# Patient Record
Sex: MALE | Race: WHITE | NOT HISPANIC OR LATINO | Employment: FULL TIME | URBAN - METROPOLITAN AREA
[De-identification: names, ages, dates, MRNs, and addresses within clinical notes are randomized per-mention and may not be internally consistent; named-entity substitution may affect disease eponyms.]

---

## 2017-01-11 ENCOUNTER — OFFICE VISIT (OUTPATIENT)
Dept: CARDIOLOGY | Facility: CLINIC | Age: 57
End: 2017-01-11

## 2017-01-11 ENCOUNTER — HOSPITAL ENCOUNTER (OUTPATIENT)
Dept: CARDIOLOGY | Facility: HOSPITAL | Age: 57
Discharge: HOME | End: 2017-01-11
Attending: INTERNAL MEDICINE | Admitting: INTERNAL MEDICINE

## 2017-01-11 VITALS
SYSTOLIC BLOOD PRESSURE: 110 MMHG | HEART RATE: 59 BPM | DIASTOLIC BLOOD PRESSURE: 72 MMHG | WEIGHT: 255 LBS | BODY MASS INDEX: 33.8 KG/M2 | HEIGHT: 73 IN

## 2017-01-11 VITALS
WEIGHT: 250 LBS | BODY MASS INDEX: 33.13 KG/M2 | DIASTOLIC BLOOD PRESSURE: 80 MMHG | SYSTOLIC BLOOD PRESSURE: 119 MMHG | HEIGHT: 73 IN

## 2017-01-11 DIAGNOSIS — I42.8 NICM (NONISCHEMIC CARDIOMYOPATHY) (HCC): ICD-10-CM

## 2017-01-11 DIAGNOSIS — I42.8 NONISCHEMIC CARDIOMYOPATHY (HCC): Primary | ICD-10-CM

## 2017-01-11 DIAGNOSIS — I50.1 LEFT HEART FAILURE (HCC): Primary | ICD-10-CM

## 2017-01-11 LAB
BH CV ECHO MEAS - AO ROOT AREA (BSA CORRECTED): 1.5
BH CV ECHO MEAS - AO ROOT AREA: 9.3 CM^2
BH CV ECHO MEAS - AO ROOT DIAM: 3.4 CM
BH CV ECHO MEAS - BSA(HAYCOCK): 2.4 M^2
BH CV ECHO MEAS - BSA: 2.4 M^2
BH CV ECHO MEAS - BZI_BMI: 33 KILOGRAMS/M^2
BH CV ECHO MEAS - BZI_METRIC_HEIGHT: 185.4 CM
BH CV ECHO MEAS - BZI_METRIC_WEIGHT: 113.4 KG
BH CV ECHO MEAS - CONTRAST EF (2CH): 56.3 ML/M^2
BH CV ECHO MEAS - CONTRAST EF 4CH: 49.5 ML/M^2
BH CV ECHO MEAS - EDV(CUBED): 127.7 ML
BH CV ECHO MEAS - EDV(MOD-SP2): 71 ML
BH CV ECHO MEAS - EDV(MOD-SP4): 103 ML
BH CV ECHO MEAS - EDV(TEICH): 120.2 ML
BH CV ECHO MEAS - EF(CUBED): 66.7 %
BH CV ECHO MEAS - EF(MOD-SP2): 56.3 %
BH CV ECHO MEAS - EF(TEICH): 57.9 %
BH CV ECHO MEAS - ESV(CUBED): 42.5 ML
BH CV ECHO MEAS - ESV(MOD-SP2): 31 ML
BH CV ECHO MEAS - ESV(MOD-SP4): 52 ML
BH CV ECHO MEAS - ESV(TEICH): 50.6 ML
BH CV ECHO MEAS - FS: 30.7 %
BH CV ECHO MEAS - IVS/LVPW: 1
BH CV ECHO MEAS - IVSD: 1.4 CM
BH CV ECHO MEAS - LA DIMENSION: 4.1 CM
BH CV ECHO MEAS - LA/AO: 1.2
BH CV ECHO MEAS - LV DIASTOLIC VOL/BSA (35-75): 43.5 ML/M^2
BH CV ECHO MEAS - LV MASS(C)D: 289.1 GRAMS
BH CV ECHO MEAS - LV MASS(C)DI: 122.2 GRAMS/M^2
BH CV ECHO MEAS - LV SYSTOLIC VOL/BSA (12-30): 22 ML/M^2
BH CV ECHO MEAS - LVIDD: 5 CM
BH CV ECHO MEAS - LVIDS: 3.5 CM
BH CV ECHO MEAS - LVLD AP2: 7 CM
BH CV ECHO MEAS - LVLD AP4: 7.1 CM
BH CV ECHO MEAS - LVLS AP2: 6 CM
BH CV ECHO MEAS - LVLS AP4: 6.7 CM
BH CV ECHO MEAS - LVPWD: 1.4 CM
BH CV ECHO MEAS - RVDD: 3.2 CM
BH CV ECHO MEAS - SI(CUBED): 36 ML/M^2
BH CV ECHO MEAS - SI(MOD-SP2): 16.9 ML/M^2
BH CV ECHO MEAS - SI(MOD-SP4): 21.6 ML/M^2
BH CV ECHO MEAS - SI(TEICH): 29.4 ML/M^2
BH CV ECHO MEAS - SV(CUBED): 85.1 ML
BH CV ECHO MEAS - SV(MOD-SP2): 40 ML
BH CV ECHO MEAS - SV(MOD-SP4): 51 ML
BH CV ECHO MEAS - SV(TEICH): 69.6 ML

## 2017-01-11 PROCEDURE — 93308 TTE F-UP OR LMTD: CPT

## 2017-01-11 PROCEDURE — 93308 TTE F-UP OR LMTD: CPT | Performed by: INTERNAL MEDICINE

## 2017-01-11 PROCEDURE — 99213 OFFICE O/P EST LOW 20 MIN: CPT | Performed by: INTERNAL MEDICINE

## 2017-01-11 RX ORDER — CARVEDILOL 6.25 MG/1
6.25 TABLET ORAL 2 TIMES DAILY
Qty: 180 TABLET | Refills: 3 | Status: SHIPPED | OUTPATIENT
Start: 2017-01-11 | End: 2018-01-18 | Stop reason: SDUPTHER

## 2017-01-11 RX ORDER — LISINOPRIL 10 MG/1
10 TABLET ORAL DAILY
Qty: 90 TABLET | Refills: 3 | Status: SHIPPED | OUTPATIENT
Start: 2017-01-11 | End: 2018-01-18 | Stop reason: SDUPTHER

## 2017-01-11 NOTE — LETTER
January 11, 2017     Nathaniel Jordan MD  [address] [place] [address] [address]  [place] [address]    Patient: Conner Dunn   YOB: 1960   Date of Visit: 1/11/2017       Dear Dr. Nikki MD:    Thank you for referring Conner Dunn to me for evaluation. Below are the relevant portions of my assessment and plan of care.    If you have questions, please do not hesitate to call me. I look forward to following Conner along with you.         Sincerely,        Kira Scanlon MD        CC: No Recipients  Kira Scanlon MD  1/11/2017  2:43 PM  Signed  Conner Dunn  1960  [phone]  [phone]    01/11/2017    Nathaniel Jordan MD    Chief Complaint   Patient presents with   • Cardiomyopathy     IDENTIFICATION:  A 56-year-old  white male  and resident of [place], [place].     PROBLEM LIST:  1. Nonischemic cardiomyopathy:  a. History of abnormal echocardiogram, 02/21/2011, revealing LVEF (26%) with moderate to severe global  hypokinesis, moderate LVH, left atrium of 4.8 cm.  b. Cardiolite GXT, 02/21/2011, Dr. Hernandez, revealing ejection fraction of 26%.  Patient walked for 8 minutes and 28 seconds, achieving 86% of target heart rate.  Negative for chest pain.  PVCs at rest,  resolving with exercise.  No evidence of reversible ischemia.  c. Cardiac catheterization, 02/28/2011, Kira Scanlon MD, revealing LVEF (20% to 25%) with mild mitral regurgitation.  Normal coronary arteries.  d. Evaluation at the [facility] in  April 2011 with an echocardiogram revealing an ejection fraction of 30%.   e. Holter monitor, April 2011, revealing 11,000 PVCs in a 24-hour period of time.  f. Initiation of amiodarone therapy.  g. Repeat echocardiogram, August 2011 at the [facility]  revealed an ejection fraction of 51%.  Holter revealed rare PVCs.  h. Discontinuation of amiodarone therapy.  i. Echocardiogram, December 2011, [facility], with an LVEF (61%), with a  Holter monitor revealing 3% PVCs to the total beats in a 24-hour period.  j.  Echocardiogram, 01/23/2014:  LVEF 55% to 60% with trace MR and trace TR.  k. Echocardiogram, 1/11/2017: EF 55-60%. Trace MR, trace TR.   2. Severe obstructive sleep apnea, diagnosed in April 2011, currently uses CPAP on a nightly basis.  3. Metabolic syndrome.  4. History of mild renal insufficiency.  5.  Mild-to-moderate ascending aortic dilatation, 42 mm ([facility]).  6. Cholecystectomy, 9/9/2016.     Allergies   Allergen Reactions   • Ibuprofen        Current Medications:      Current Outpatient Prescriptions:   •  aspirin 81 MG EC tablet, Take 81 mg by mouth daily., Disp: , Rfl:   •  carvedilol (COREG) 6.25 MG tablet, TAKE ONE TABLET BY MOUTH TWICE A DAY, Disp: 180 tablet, Rfl: 2  •  lisinopril (PRINIVIL,ZESTRIL) 10 MG tablet, TAKE ONE TABLET BY MOUTH DAILY, Disp: 90 tablet, Rfl: 2  •  Methylcellulose, Laxative, (CITRUCEL PO), Take  by mouth Daily., Disp: , Rfl:   •  Multiple Vitamins-Minerals (MULTIVITAMIN PO), Take 1 tablet by mouth daily., Disp: , Rfl:   •  naproxen sodium (ALEVE) 220 MG tablet, Take 440 mg by mouth daily as needed for mild pain (1-3)., Disp: , Rfl:     HPI    Mr. Dunn presents today for echocardiogram and 12 month follow up for cardiomyopathy. He had an emergency cholecystectomy in September of 2016, and has recovered well. He reports feeling well from a cardiac standpoint, denying chest pain, palpitations, shortness of breath, edema, PND, orthopnea, dizziness, and syncope. He typically keeps active walking outside, and has lost 4 lbs since this time last year.     The following portions of the patient's history were reviewed and updated as appropriate: allergies, current medications and problem list.    Pertinent positives as listed in the HPI.  All other systems reviewed are negative.    Vitals:    01/11/17 1144   BP: 110/72   BP Location: Right arm   Patient Position: Sitting   Pulse: 59   Weight: 255 lb  "(116 kg)   Height: 73\" (185.4 cm)       General: Alert and oriented  Neck: Jugular venous pressure is within normal limits. Carotids have normal upstrokes without bruits.   Cardiovascular: Heart has a nondisplaced focal PMI. Regular rate and rhythm without murmur, gallop or rub.  Lungs: Clear without rales or wheezes. Equal expansion is noted.   Extremities: Show no edema.  Skin: warm and dry.  Neurologic: nonfocal      Diagnostic Data:    Lab Results   Component Value Date    GLUCOSE 117 (H) 09/09/2016    BUN 12 09/09/2016    CREATININE 1.20 09/09/2016    EGFRIFNONA 63 09/09/2016    BCR 10.0 09/09/2016    CO2 27.0 09/09/2016    CALCIUM 9.9 09/09/2016    ALBUMIN 4.30 09/09/2016    LABIL2 1.5 09/09/2016    AST 33 09/09/2016    ALT 33 09/09/2016     Lab Results   Component Value Date    WBC 12.64 (H) 09/09/2016    HGB 14.8 09/09/2016    HCT 45.1 09/09/2016    MCV 92.8 09/09/2016     09/09/2016     Procedures    Assessment:      ICD-10-CM ICD-9-CM   1. Nonischemic cardiomyopathy I42.9 425.4       Plan:    1. Recommended increasing aerobic exercise. Suggested indoor activities (indoor track, stationary bike) during the colder months.   2. Continue current medications.  3. F/up in 12 months or sooner if needed.    Scribed for Kira Scanlon MD by Margareth Jones. 1/11/2017  12:02 PM    I Kira Scanlon MD personally performed the services described in this documentation as scribed by the above individual in my presence, and it is both accurate and complete.          "

## 2017-01-11 NOTE — PROGRESS NOTES
Conner Dunn  1960  [phone]  [phone]    01/11/2017    Nathaniel Jordan MD    Chief Complaint   Patient presents with   • Cardiomyopathy     IDENTIFICATION:  A 56-year-old  white male  and resident of [place], [place].     PROBLEM LIST:  1. Nonischemic cardiomyopathy:  a. History of abnormal echocardiogram, 02/21/2011, revealing LVEF (26%) with moderate to severe global  hypokinesis, moderate LVH, left atrium of 4.8 cm.  b. Cardiolite GXT, 02/21/2011, Dr. Hernandez, revealing ejection fraction of 26%.  Patient walked for 8 minutes and 28 seconds, achieving 86% of target heart rate.  Negative for chest pain.  PVCs at rest,  resolving with exercise.  No evidence of reversible ischemia.  c. Cardiac catheterization, 02/28/2011, Kira Scanlon MD, revealing LVEF (20% to 25%) with mild mitral regurgitation.  Normal coronary arteries.  d. Evaluation at the [facility] in  April 2011 with an echocardiogram revealing an ejection fraction of 30%.   e. Holter monitor, April 2011, revealing 11,000 PVCs in a 24-hour period of time.  f. Initiation of amiodarone therapy.  g. Repeat echocardiogram, August 2011 at the [facility]  revealed an ejection fraction of 51%.  Holter revealed rare PVCs.  h. Discontinuation of amiodarone therapy.  i. Echocardiogram, December 2011, [facility], with an LVEF (61%), with a Holter monitor revealing 3% PVCs to the total beats in a 24-hour period.  j.  Echocardiogram, 01/23/2014:  LVEF 55% to 60% with trace MR and trace TR.  k. Echocardiogram, 1/11/2017: EF 55-60%. Trace MR, trace TR.   2. Severe obstructive sleep apnea, diagnosed in April 2011, currently uses CPAP on a nightly basis.  3. Metabolic syndrome.  4. History of mild renal insufficiency.  5.  Mild-to-moderate ascending aortic dilatation, 42 mm ([facility]).  6. Cholecystectomy, 9/9/2016.     Allergies   Allergen Reactions   • Ibuprofen        Current Medications:      Current Outpatient  "Prescriptions:   •  aspirin 81 MG EC tablet, Take 81 mg by mouth daily., Disp: , Rfl:   •  carvedilol (COREG) 6.25 MG tablet, TAKE ONE TABLET BY MOUTH TWICE A DAY, Disp: 180 tablet, Rfl: 2  •  lisinopril (PRINIVIL,ZESTRIL) 10 MG tablet, TAKE ONE TABLET BY MOUTH DAILY, Disp: 90 tablet, Rfl: 2  •  Methylcellulose, Laxative, (CITRUCEL PO), Take  by mouth Daily., Disp: , Rfl:   •  Multiple Vitamins-Minerals (MULTIVITAMIN PO), Take 1 tablet by mouth daily., Disp: , Rfl:   •  naproxen sodium (ALEVE) 220 MG tablet, Take 440 mg by mouth daily as needed for mild pain (1-3)., Disp: , Rfl:     HPI    Mr. Dunn presents today for echocardiogram and 12 month follow up for cardiomyopathy. He had an emergency cholecystectomy in September of 2016, and has recovered well. He reports feeling well from a cardiac standpoint, denying chest pain, palpitations, shortness of breath, edema, PND, orthopnea, dizziness, and syncope. He typically keeps active walking outside, and has lost 4 lbs since this time last year.     The following portions of the patient's history were reviewed and updated as appropriate: allergies, current medications and problem list.    Pertinent positives as listed in the HPI.  All other systems reviewed are negative.    Vitals:    01/11/17 1144   BP: 110/72   BP Location: Right arm   Patient Position: Sitting   Pulse: 59   Weight: 255 lb (116 kg)   Height: 73\" (185.4 cm)       General: Alert and oriented  Neck: Jugular venous pressure is within normal limits. Carotids have normal upstrokes without bruits.   Cardiovascular: Heart has a nondisplaced focal PMI. Regular rate and rhythm without murmur, gallop or rub.  Lungs: Clear without rales or wheezes. Equal expansion is noted.   Extremities: Show no edema.  Skin: warm and dry.  Neurologic: nonfocal      Diagnostic Data:    Lab Results   Component Value Date    GLUCOSE 117 (H) 09/09/2016    BUN 12 09/09/2016    CREATININE 1.20 09/09/2016    EGFRIFNONA 63 " 09/09/2016    BCR 10.0 09/09/2016    CO2 27.0 09/09/2016    CALCIUM 9.9 09/09/2016    ALBUMIN 4.30 09/09/2016    LABIL2 1.5 09/09/2016    AST 33 09/09/2016    ALT 33 09/09/2016     Lab Results   Component Value Date    WBC 12.64 (H) 09/09/2016    HGB 14.8 09/09/2016    HCT 45.1 09/09/2016    MCV 92.8 09/09/2016     09/09/2016     Procedures    Assessment:      ICD-10-CM ICD-9-CM   1. Nonischemic cardiomyopathy I42.9 425.4       Plan:    1. Recommended increasing aerobic exercise. Suggested indoor activities (indoor track, stationary bike) during the colder months.   2. Continue current medications.  3. F/up in 12 months or sooner if needed.    Scribed for Kira Scanlon MD by Margareth Jones. 1/11/2017  12:02 PM    I Kira Scanlon MD personally performed the services described in this documentation as scribed by the above individual in my presence, and it is both accurate and complete.

## 2017-01-11 NOTE — MR AVS SNAPSHOT
Conner Dunn   1/11/2017 11:45 AM   Office Visit    Dept Phone:  [phone]   Encounter #:  [id number]    Provider:  Kira Scanlon MD   Department:  [facility] CARDIOLOGY                Your Full Care Plan              Today's Medication Changes          These changes are accurate as of: 1/11/17 12:09 PM.  If you have any questions, ask your nurse or doctor.               Medication(s)that have changed:     carvedilol 6.25 MG tablet   Commonly known as:  COREG   Take 1 tablet by mouth 2 (Two) Times a Day.   What changed:  See the new instructions.   Changed by:  Kira Scanlon MD       lisinopril 10 MG tablet   Commonly known as:  PRINIVIL,ZESTRIL   Take 1 tablet by mouth Daily.   What changed:  See the new instructions.   Changed by:  Kira Scanlon MD            Where to Get Your Medications      These medications were sent to [address] [place] [id number] [place] [id number] FX  [address] [facility] [address]     Phone:  [phone]     carvedilol 6.25 MG tablet    lisinopril 10 MG tablet                  Your Updated Medication List          This list is accurate as of: 1/11/17 12:09 PM.  Always use your most recent med list.                aspirin 81 MG EC tablet       carvedilol 6.25 MG tablet   Commonly known as:  COREG   Take 1 tablet by mouth 2 (Two) Times a Day.       CITRUCEL PO       lisinopril 10 MG tablet   Commonly known as:  PRINIVIL,ZESTRIL   Take 1 tablet by mouth Daily.       MULTIVITAMIN PO       naproxen sodium 220 MG tablet   Commonly known as:  ALEVE               You Were Diagnosed With        Codes Comments    Nonischemic cardiomyopathy    -  Primary ICD-10-CM: I42.9  ICD-9-CM: 425.4       Instructions     None    Patient Instructions History      Upcoming Appointments     Visit Type Date Time Department    FOLLOW UP 1/11/2017 11:45 AM [facility]    [facility]  "ECHO 2D COMPLETE WITH CONTRAST VT 2017 10:30 AM [facility] NONINVASIVE LAB      MyChart Signup     [facility] [employer] allows you to send messages to your doctor, view your test results, renew your prescriptions, schedule appointments, and more. To sign up, go to [employer] and click on the Sign Up Now link in the New User? box. Enter your [employer] Activation Code exactly as it appears below along with the last four digits of your Social Security Number and your Date of Birth () to complete the sign-up process. If you do not sign up before the expiration date, you must request a new code.    [employer] Activation Code: [email or web address]  Expires: 2017 12:09 PM    If you have questions, you can email [email or web address] or call [phone] to talk to our [employer] staff. Remember, [employer] is NOT to be used for urgent needs. For medical emergencies, dial 911.               Other Info from Your Visit           Your Appointments     2018 10:30 AM EST   Follow Up with Kira Scanlon MD   [facility] (--)    [address] [place] [address] [address]  [facility] [address] [phone]           Arrive 15 minutes prior to appointment.              Allergies     Ibuprofen        Reason for Visit     Cardiomyopathy           Vital Signs     Blood Pressure Pulse Height Weight Body Mass Index Smoking Status    110/72 (BP Location: Right arm, Patient Position: Sitting) 59 73\" (185.4 cm) 255 lb (116 kg) 33.64 kg/m2 Never Smoker      Problems and Diagnoses Noted     Nonischemic cardiomyopathy        "

## 2018-01-17 ENCOUNTER — OFFICE VISIT (OUTPATIENT)
Dept: CARDIOLOGY | Facility: CLINIC | Age: 58
End: 2018-01-17

## 2018-01-17 VITALS
WEIGHT: 261 LBS | HEART RATE: 56 BPM | SYSTOLIC BLOOD PRESSURE: 142 MMHG | BODY MASS INDEX: 34.59 KG/M2 | DIASTOLIC BLOOD PRESSURE: 96 MMHG | HEIGHT: 73 IN

## 2018-01-17 DIAGNOSIS — G47.33 OBSTRUCTIVE SLEEP APNEA: ICD-10-CM

## 2018-01-17 DIAGNOSIS — I42.8 NONISCHEMIC CARDIOMYOPATHY (HCC): ICD-10-CM

## 2018-01-17 DIAGNOSIS — I10 HYPERTENSION, UNSPECIFIED TYPE: Primary | ICD-10-CM

## 2018-01-17 PROCEDURE — 99213 OFFICE O/P EST LOW 20 MIN: CPT | Performed by: INTERNAL MEDICINE

## 2018-01-17 NOTE — PROGRESS NOTES
Conner Dunn  1960  [phone]  [phone]    01/17/2018    Nathaniel Jordan MD    Chief Complaint: NICM      PROBLEM LIST:  1. Nonischemic cardiomyopathy:  a. History of abnormal echocardiogram, 02/21/2011, revealing LVEF (26%) with moderate to severe global  hypokinesis, moderate LVH, left atrium of 4.8 cm.  b. Cardiolite GXT, 02/21/2011, Dr. Hernandez, revealing ejection fraction of 26%.  Patient walked for 8 minutes and 28 seconds, achieving 86% of target heart rate.  Negative for chest pain.  PVCs at rest,  resolving with exercise.  No evidence of reversible ischemia.  c. Cardiac catheterization, 02/28/2011, Kira Scanlon MD, revealing LVEF (20% to 25%) with mild mitral regurgitation.  Normal coronary arteries.  d. Evaluation at the [facility] in  April 2011 with an echocardiogram revealing an ejection fraction of 30%.   e. Holter monitor, April 2011, revealing 11,000 PVCs in a 24-hour period of time.  f. Initiation of amiodarone therapy.  g. Repeat echocardiogram, August 2011 at the [facility]  revealed an ejection fraction of 51%.  Holter revealed rare PVCs.  h. Discontinuation of amiodarone therapy.  i. Echocardiogram, December 2011, [facility], with an LVEF (61%), with a Holter monitor revealing 3% PVCs to the total beats in a 24-hour period.  j.  Echocardiogram, 01/23/2014:  LVEF 55% to 60% with trace MR and trace TR.  k. Echocardiogram, 1/11/2017: EF 55-60%. Trace MR, trace TR.   2. Severe obstructive sleep apnea, diagnosed in April 2011, currently uses CPAP on a nightly basis.  3. Metabolic syndrome.  4. History of mild renal insufficiency.  5.  Mild-to-moderate ascending aortic dilatation, 42 mm ([facility]).  6. Cholecystectomy, 9/9/2016.     Allergies   Allergen Reactions   • Ibuprofen        Current Medications:    Current Outpatient Prescriptions:   •  aspirin 81 MG EC tablet, Take 81 mg by mouth daily., Disp: , Rfl:   •  carvedilol (COREG) 6.25 MG tablet, Take 1 tablet by  "mouth 2 (Two) Times a Day., Disp: 180 tablet, Rfl: 3  •  lisinopril (PRINIVIL,ZESTRIL) 10 MG tablet, Take 1 tablet by mouth Daily., Disp: 90 tablet, Rfl: 3  •  Methylcellulose, Laxative, (CITRUCEL PO), Take  by mouth Daily., Disp: , Rfl:   •  Multiple Vitamins-Minerals (MULTIVITAMIN PO), Take 1 tablet by mouth daily., Disp: , Rfl:   •  naproxen sodium (ALEVE) 220 MG tablet, Take 440 mg by mouth daily as needed for mild pain (1-3)., Disp: , Rfl:     HPI  Conner Dunn returns today for follow up with a history of nonischemic cardiomyopathy, severe obstructive sleep apnea, and a mild to moderate ascending aortic dilatation. Since last visit, patient has been doing well overall from a cardiovascular standpoint. He just got back from a cruise with his family. His BP at home has been around 130/82. He has not been experiencing shortness of breath. Denies any complaints of chest pain, pressure, tightness, or unusual dyspnea. He does not follow a routine exercise regimen. He occasionally walks and says he goes at a brisk pace when he does.     The following portions of the patient's history were reviewed and updated as appropriate: allergies, current medications and problem list.    Pertinent positives as listed in the HPI.  All other systems reviewed are negative.    Vitals:    01/17/18 1046 01/17/18 1058   BP: 152/78 142/96   BP Location: Right arm Right arm   Patient Position: Sitting    Pulse: 56    Weight: 118 kg (261 lb)    Height: 185.4 cm (73\")        General: Alert, awake, and oriented  Neck: Jugular venous pressure is within normal limits. Carotids have normal upstrokes without bruits.   Cardiovascular: Heart has a nondisplaced focal PMI. Regular rate and rhythm without murmur, gallop or rub.  Lungs: Clear without rales or wheezes. Equal expansion is noted.   Extremities: Show no edema.  Skin: Warm and dry.  Neurologic: nonfocal    Diagnostic Data:  Procedures    Assessment:    ICD-10-CM ICD-9-CM   1. " Hypertension, unspecified type I10 401.9   2. Nonischemic cardiomyopathy I42.8 425.4   3. Obstructive sleep apnea G47.33 327.23       Plan:    1. Start a routine exercise regimen; 30 minutes per day, 4-5 days per week. Work up to 30 minutes first, then increase pace.   2. Continue to watch BP at home. If average BP runs above 130/80, increase lisinopril to 20 mg q d  3. Continue current medications.  4. Echocardiogram in 2020 to check EF   5. F/up in 12 months or sooner if needed.      Scribed for Kira Scanlon MD by Awa Francois. 1/17/2018  11:02 AM    I Kira Scanlon MD personally performed the services described in this documentation as scribed by the above individual in my presence, and it is both accurate and complete.    Kira Scanlon MD, FACC

## 2018-01-18 RX ORDER — CARVEDILOL 6.25 MG/1
TABLET ORAL
Qty: 180 TABLET | Refills: 3 | Status: SHIPPED | OUTPATIENT
Start: 2018-01-18 | End: 2019-01-17 | Stop reason: SDUPTHER

## 2018-01-18 RX ORDER — LISINOPRIL 10 MG/1
TABLET ORAL
Qty: 90 TABLET | Refills: 3 | Status: SHIPPED | OUTPATIENT
Start: 2018-01-18 | End: 2019-01-17 | Stop reason: SDUPTHER

## 2019-01-17 RX ORDER — CARVEDILOL 6.25 MG/1
TABLET ORAL
Qty: 180 TABLET | Refills: 1 | Status: SHIPPED | OUTPATIENT
Start: 2019-01-17 | End: 2019-07-16 | Stop reason: SDUPTHER

## 2019-01-17 RX ORDER — LISINOPRIL 10 MG/1
TABLET ORAL
Qty: 90 TABLET | Refills: 1 | Status: SHIPPED | OUTPATIENT
Start: 2019-01-17 | End: 2019-07-16 | Stop reason: SDUPTHER

## 2019-01-22 NOTE — PROGRESS NOTES
Conner Dunn  1960  [phone]  [phone]    01/23/2019    Nathaniel Jordan MD    Chief Complaint   Patient presents with   • Cardiomyopathy   • Hypertension     Problem List:  1. Nonischemic cardiomyopathy:  a. History of abnormal echocardiogram, 02/21/2011, revealing LVEF (26%) with moderate to severe global  hypokinesis, moderate LVH, left atrium of 4.8 cm.  b. Cardiolite GXT, 02/21/2011, Dr. Hernandez, revealing ejection fraction of 26%.  Patient walked for 8 minutes and 28 seconds, achieving 86% of target heart rate.  Negative for chest pain.  PVCs at rest,  resolving with exercise.  No evidence of reversible ischemia.  c. Cardiac catheterization, 02/28/2011, Kira Scanlon MD, revealing LVEF (20% to 25%) with mild mitral regurgitation.  Normal coronary arteries.  d. Evaluation at the [facility] in  April 2011 with an echocardiogram revealing an ejection fraction of 30%.   e. Holter monitor, April 2011, revealing 11,000 PVCs in a 24-hour period of time.  f. Initiation of amiodarone therapy.  g. Repeat echocardiogram, August 2011 at the [facility]  revealed an ejection fraction of 51%.  Holter revealed rare PVCs.  h. Discontinuation of amiodarone therapy.  i. Echocardiogram, December 2011, [facility], with an LVEF (61%), with a Holter monitor revealing 3% PVCs to the total beats in a 24-hour period.  j.  Echocardiogram, 01/23/2014:  LVEF 55% to 60% with trace MR and trace TR.  k. Echocardiogram, 1/11/2017: EF 55-60%. Trace MR, trace TR.   2. Severe obstructive sleep apnea, diagnosed in April 2011, currently uses CPAP on a nightly basis.  3. Metabolic syndrome.  4. History of mild renal insufficiency.  5.  Mild-to-moderate ascending aortic dilatation, 42 mm ([facility]).  6. Cholecystectomy, 9/9/2016.       Allergies   Allergen Reactions   • Ibuprofen        Current Medications    Current Outpatient Medications:   •  aspirin 81 MG EC tablet, Take 81 mg by mouth daily., Disp: , Rfl:   •   "carvedilol (COREG) 6.25 MG tablet, TAKE ONE TABLET BY MOUTH TWICE A DAY, Disp: 180 tablet, Rfl: 1  •  lisinopril (PRINIVIL,ZESTRIL) 10 MG tablet, TAKE ONE TABLET BY MOUTH DAILY, Disp: 90 tablet, Rfl: 1  •  Methylcellulose, Laxative, (CITRUCEL PO), Take  by mouth Daily., Disp: , Rfl:   •  Multiple Vitamins-Minerals (MULTIVITAMIN PO), Take 1 tablet by mouth daily., Disp: , Rfl:   •  naproxen sodium (ALEVE) 220 MG tablet, Take 440 mg by mouth daily as needed for mild pain (1-3)., Disp: , Rfl:     History of Present Illness   HPI  Patient is a pleasant 58-year-old male with the above-noted medical history presents today for annual follow-up of his nonischemic cardiomyopathy and hypertension.  Since his last visit, he has been doing well from a cardiac standpoint.  His blood pressure remains controlled.  He admits that he is not following any kind of routine exercise regimen but is making lifestyle and dietary modifications slowly as of the beginning of the year to help to be able to maintain a healthier lifestyle.  He denies having any chest pain, shortness of breath, dyspnea on exertion, edema, fatigue, palpitations, dizziness and syncope.  Overall, he is feeling well from the cardiac standpoint.  He continues to be compliant with his CPAP every night.  We will obtain an echocardiogram with his next visit to reassess LV function.      The following portions of the patient's history were reviewed and updated as appropriate: allergies, current medications and problem list.    Pertinent positives as listed in the HPI.  All other systems reviewed are negative.    Vitals:    01/23/19 1007   BP: 126/84   BP Location: Left arm   Patient Position: Sitting   Pulse: 70   Weight: 118 kg (259 lb 3.2 oz)   Height: 185.4 cm (73\")       Physical Exam  GENERAL: well-developed, well-nourished; in no acute distress.   NECK:  Carotid upstrokes are 2+ and  symmetrical without bruits.   LUNGS: Clear to auscultation bilaterally without " wheezing, rhonchi, or rales noted.   CARDIOVASCULAR: The heart has a regular rate with a normal S1 and S2. There is no murmur, gallop, rub, or click appreciated. The PMI is nondisplaced.   ABDOMEN: Soft and nontender  NEUROLOGICAL: Nonfocal; Alert and oriented  PERIPHERAL VASCULAR:  Posterior tibial pulses are 2+ and symmetrical. There is no peripheral edema.   SKIN:  Warm and dry  PSYCHIATRIC: normal mood and affect; behavior appropriate    Diagnostic Data:  Procedures    Assessment:      ICD-10-CM ICD-9-CM   1. Nonischemic cardiomyopathy (CMS/HCC) I42.8 425.4   2. Essential hypertension I10 401.9       Plan:  Echo in 2020 to assess LV function  Encouraged routine exercise and dietary modifications  Continue lisinopril and Coreg for hypertension  F/up in 12 months or sooner if needed.      Seen independently by BRIDGETTE Roque on January 23, 2019 @ 7582

## 2019-01-23 ENCOUNTER — OFFICE VISIT (OUTPATIENT)
Dept: CARDIOLOGY | Facility: CLINIC | Age: 59
End: 2019-01-23

## 2019-01-23 VITALS
BODY MASS INDEX: 34.35 KG/M2 | HEIGHT: 73 IN | HEART RATE: 70 BPM | WEIGHT: 259.2 LBS | SYSTOLIC BLOOD PRESSURE: 126 MMHG | DIASTOLIC BLOOD PRESSURE: 84 MMHG

## 2019-01-23 DIAGNOSIS — I42.8 NONISCHEMIC CARDIOMYOPATHY (HCC): Primary | ICD-10-CM

## 2019-01-23 DIAGNOSIS — I10 ESSENTIAL HYPERTENSION: ICD-10-CM

## 2019-01-23 PROCEDURE — 99214 OFFICE O/P EST MOD 30 MIN: CPT | Performed by: NURSE PRACTITIONER

## 2019-07-16 DIAGNOSIS — I42.8 NONISCHEMIC CARDIOMYOPATHY (HCC): Primary | ICD-10-CM

## 2019-07-16 RX ORDER — CARVEDILOL 6.25 MG/1
TABLET ORAL
Qty: 180 TABLET | Refills: 2 | Status: SHIPPED | OUTPATIENT
Start: 2019-07-16 | End: 2020-02-07 | Stop reason: SDUPTHER

## 2019-07-16 RX ORDER — LISINOPRIL 10 MG/1
TABLET ORAL
Qty: 90 TABLET | Refills: 2 | Status: SHIPPED | OUTPATIENT
Start: 2019-07-16 | End: 2020-01-30 | Stop reason: DRUGHIGH

## 2020-01-28 DIAGNOSIS — I42.8 NONISCHEMIC CARDIOMYOPATHY (HCC): Primary | ICD-10-CM

## 2020-01-30 ENCOUNTER — OFFICE VISIT (OUTPATIENT)
Dept: CARDIOLOGY | Facility: CLINIC | Age: 60
End: 2020-01-30

## 2020-01-30 ENCOUNTER — APPOINTMENT (OUTPATIENT)
Dept: CARDIOLOGY | Facility: HOSPITAL | Age: 60
End: 2020-01-30

## 2020-01-30 ENCOUNTER — HOSPITAL ENCOUNTER (OUTPATIENT)
Dept: CARDIOLOGY | Facility: HOSPITAL | Age: 60
Discharge: HOME | End: 2020-01-30
Admitting: INTERNAL MEDICINE

## 2020-01-30 VITALS — BODY MASS INDEX: 34.19 KG/M2 | WEIGHT: 257.94 LBS | HEIGHT: 73 IN

## 2020-01-30 VITALS
HEART RATE: 74 BPM | SYSTOLIC BLOOD PRESSURE: 160 MMHG | HEIGHT: 73 IN | BODY MASS INDEX: 34.72 KG/M2 | WEIGHT: 262 LBS | DIASTOLIC BLOOD PRESSURE: 98 MMHG

## 2020-01-30 DIAGNOSIS — I42.8 NONISCHEMIC CARDIOMYOPATHY (HCC): ICD-10-CM

## 2020-01-30 DIAGNOSIS — I49.3 PREMATURE VENTRICULAR CONTRACTIONS: Primary | ICD-10-CM

## 2020-01-30 DIAGNOSIS — I10 ESSENTIAL HYPERTENSION: ICD-10-CM

## 2020-01-30 LAB
BH CV ECHO MEAS - AO MAX PG (FULL): 3 MMHG
BH CV ECHO MEAS - AO MAX PG: 6.4 MMHG
BH CV ECHO MEAS - AO MEAN PG (FULL): 1.3 MMHG
BH CV ECHO MEAS - AO MEAN PG: 3 MMHG
BH CV ECHO MEAS - AO ROOT AREA (BSA CORRECTED): 1.6
BH CV ECHO MEAS - AO ROOT AREA: 11.6 CM^2
BH CV ECHO MEAS - AO ROOT DIAM: 3.8 CM
BH CV ECHO MEAS - AO V2 MAX: 126.2 CM/SEC
BH CV ECHO MEAS - AO V2 MEAN: 79.2 CM/SEC
BH CV ECHO MEAS - AO V2 VTI: 23.4 CM
BH CV ECHO MEAS - ASC AORTA: 3.3 CM
BH CV ECHO MEAS - AVA(I,A): 4.4 CM^2
BH CV ECHO MEAS - AVA(I,D): 4.4 CM^2
BH CV ECHO MEAS - AVA(V,A): 3.5 CM^2
BH CV ECHO MEAS - AVA(V,D): 3.5 CM^2
BH CV ECHO MEAS - BSA(HAYCOCK): 2.5 M^2
BH CV ECHO MEAS - BSA: 2.4 M^2
BH CV ECHO MEAS - BZI_BMI: 34.3 KILOGRAMS/M^2
BH CV ECHO MEAS - BZI_METRIC_HEIGHT: 185.4 CM
BH CV ECHO MEAS - BZI_METRIC_WEIGHT: 117.9 KG
BH CV ECHO MEAS - EDV(CUBED): 240.4 ML
BH CV ECHO MEAS - EDV(MOD-SP2): 112 ML
BH CV ECHO MEAS - EDV(MOD-SP4): 147 ML
BH CV ECHO MEAS - EDV(TEICH): 195.3 ML
BH CV ECHO MEAS - EF(CUBED): 67.2 %
BH CV ECHO MEAS - EF(MOD-BP): 55 %
BH CV ECHO MEAS - EF(MOD-SP2): 45.5 %
BH CV ECHO MEAS - EF(MOD-SP4): 46.9 %
BH CV ECHO MEAS - EF(TEICH): 57.8 %
BH CV ECHO MEAS - ESV(CUBED): 78.7 ML
BH CV ECHO MEAS - ESV(MOD-SP2): 61 ML
BH CV ECHO MEAS - ESV(MOD-SP4): 78 ML
BH CV ECHO MEAS - ESV(TEICH): 82.4 ML
BH CV ECHO MEAS - FS: 31.1 %
BH CV ECHO MEAS - IVS/LVPW: 0.96
BH CV ECHO MEAS - IVSD: 0.89 CM
BH CV ECHO MEAS - LA DIMENSION: 4.5 CM
BH CV ECHO MEAS - LA/AO: 1.2
BH CV ECHO MEAS - LAD MAJOR: 4.9 CM
BH CV ECHO MEAS - LAT PEAK E' VEL: 7.3 CM/SEC
BH CV ECHO MEAS - LATERAL E/E' RATIO: 6.5
BH CV ECHO MEAS - LV DIASTOLIC VOL/BSA (35-75): 61.1 ML/M^2
BH CV ECHO MEAS - LV MASS(C)D: 233 GRAMS
BH CV ECHO MEAS - LV MASS(C)DI: 96.9 GRAMS/M^2
BH CV ECHO MEAS - LV MAX PG: 3.4 MMHG
BH CV ECHO MEAS - LV MEAN PG: 1.7 MMHG
BH CV ECHO MEAS - LV SYSTOLIC VOL/BSA (12-30): 32.4 ML/M^2
BH CV ECHO MEAS - LV V1 MAX: 91.8 CM/SEC
BH CV ECHO MEAS - LV V1 MEAN: 60.6 CM/SEC
BH CV ECHO MEAS - LV V1 VTI: 21.3 CM
BH CV ECHO MEAS - LVIDD: 6.2 CM
BH CV ECHO MEAS - LVIDS: 4.3 CM
BH CV ECHO MEAS - LVLD AP2: 8.6 CM
BH CV ECHO MEAS - LVLD AP4: 8.3 CM
BH CV ECHO MEAS - LVLS AP2: 6.7 CM
BH CV ECHO MEAS - LVLS AP4: 7.5 CM
BH CV ECHO MEAS - LVOT AREA (M): 4.9 CM^2
BH CV ECHO MEAS - LVOT AREA: 4.8 CM^2
BH CV ECHO MEAS - LVOT DIAM: 2.5 CM
BH CV ECHO MEAS - LVPWD: 0.93 CM
BH CV ECHO MEAS - MED PEAK E' VEL: 5.4 CM/SEC
BH CV ECHO MEAS - MEDIAL E/E' RATIO: 8.8
BH CV ECHO MEAS - MV A MAX VEL: 61.7 CM/SEC
BH CV ECHO MEAS - MV DEC TIME: 0.29 SEC
BH CV ECHO MEAS - MV E MAX VEL: 48.9 CM/SEC
BH CV ECHO MEAS - MV E/A: 0.79
BH CV ECHO MEAS - PA ACC SLOPE: 766.9 CM/SEC^2
BH CV ECHO MEAS - PA ACC TIME: 0.12 SEC
BH CV ECHO MEAS - PA PR(ACCEL): 25.1 MMHG
BH CV ECHO MEAS - RAP SYSTOLE: 3 MMHG
BH CV ECHO MEAS - RVSP: 17.9 MMHG
BH CV ECHO MEAS - SI(AO): 113.2 ML/M^2
BH CV ECHO MEAS - SI(CUBED): 67.2 ML/M^2
BH CV ECHO MEAS - SI(LVOT): 43 ML/M^2
BH CV ECHO MEAS - SI(MOD-SP2): 21.2 ML/M^2
BH CV ECHO MEAS - SI(MOD-SP4): 28.7 ML/M^2
BH CV ECHO MEAS - SI(TEICH): 46.9 ML/M^2
BH CV ECHO MEAS - SV(AO): 272.3 ML
BH CV ECHO MEAS - SV(CUBED): 161.7 ML
BH CV ECHO MEAS - SV(LVOT): 103.4 ML
BH CV ECHO MEAS - SV(MOD-SP2): 51 ML
BH CV ECHO MEAS - SV(MOD-SP4): 69 ML
BH CV ECHO MEAS - SV(TEICH): 112.8 ML
BH CV ECHO MEAS - TAPSE (>1.6): 3 CM2
BH CV ECHO MEAS - TR MAX PG: 14.9 MMHG
BH CV ECHO MEAS - TR MAX VEL: 192.7 CM/SEC
BH CV ECHO MEASUREMENTS AVERAGE E/E' RATIO: 7.7
BH CV XLRA - RV BASE: 3.9 CM
BH CV XLRA - RV LENGTH: 6 CM
BH CV XLRA - RV MID: 3 CM
LEFT ATRIUM VOLUME INDEX: 29.5 ML/M^2
LEFT ATRIUM VOLUME: 71 ML
LV EF 2D ECHO EST: 55 %

## 2020-01-30 PROCEDURE — 93000 ELECTROCARDIOGRAM COMPLETE: CPT | Performed by: INTERNAL MEDICINE

## 2020-01-30 PROCEDURE — 93306 TTE W/DOPPLER COMPLETE: CPT

## 2020-01-30 PROCEDURE — 99214 OFFICE O/P EST MOD 30 MIN: CPT | Performed by: INTERNAL MEDICINE

## 2020-01-30 PROCEDURE — 93306 TTE W/DOPPLER COMPLETE: CPT | Performed by: INTERNAL MEDICINE

## 2020-01-30 RX ORDER — MULTIVIT WITH MINERALS/LUTEIN
1000 TABLET ORAL DAILY
Qty: 90 TABLET | Refills: 0
Start: 2020-01-30

## 2020-01-30 RX ORDER — LISINOPRIL 20 MG/1
20 TABLET ORAL DAILY
Qty: 90 TABLET | Refills: 3 | Status: SHIPPED | OUTPATIENT
Start: 2020-01-30 | End: 2020-02-21 | Stop reason: SDUPTHER

## 2020-01-30 NOTE — PROGRESS NOTES
[facility]    Patient ID: Conner Dunn is a  59 y.o.  male. He is a [employer] .  : 1960   Contact: [phone]    Encounter date: 2020    PCP: Nathaniel Jordan MD      Chief complaint:   Chief Complaint   Patient presents with   • Cardiomyopathy       Problem List:  1. Nonischemic cardiomyopathy:  a. Echocardiogram, 2011: EF 26% with moderate to severe global  hypokinesis, moderate LVH, left atrium of 4.8 cm.  b. Cardiolite GXT, 2011, Dr. Hernandez: EF 26%. Patient walked for 8:28, achieving 86% of THR. Negative for chest pain. PVCs at rest, resolving with exercise. No evidence of reversible ischemia.  c. LHC, 2011, [facility]: EF 20-25%. Mild MR. Normal coronary arteries.  d. Evaluation at the [facility] in  2011 with an echocardiogram revealing EF 30%.   e. Holter monitor, 2011, revealing 11,000 PVCs in a 24-hour period of time.  f. Initiation of amiodarone therapy.  g. Echocardiogram, 2011 at the [facility]: EF 51%. Holter revealed rare PVCs.  h. Discontinuation of amiodarone therapy.  i. Echocardiogram, 2011, [facility]: EF 61%  j. 24h Holter monitor, Dec 2011: 3% PVCs.  k. Echocardiogram, 2014: EF 55-60%. Trace MR/TR.  l. Echocardiogram, 2017: EF 55-60%. Trace MR/TR.  m. Echocardiogram, 2020: EF 55-60%. Trace MR/TR. LV cavity mildly dilated.  2. Severe DARREL, diagnosed in 2011, currently uses CPAP on a nightly basis.  3. Metabolic syndrome.  4. History of mild renal insufficiency.  5. Mild-to-moderate ascending aortic dilatation, 42 mm ([facility]).  6. Cholecystectomy, 2016.     Allergies   Allergen Reactions   • Ibuprofen        Current Medications:      Current Outpatient Medications:   •  aspirin 81 MG EC tablet, Take 81 mg by mouth daily., Disp: , Rfl:   •  carvedilol (COREG) 6.25 MG tablet, TAKE ONE TABLET BY MOUTH TWICE A DAY, Disp: 180 tablet, Rfl: 2  •   "lisinopril (PRINIVIL,ZESTRIL) 10 MG tablet, TAKE ONE TABLET BY MOUTH DAILY, Disp: 90 tablet, Rfl: 2  •  Methylcellulose, Laxative, (CITRUCEL PO), Take  by mouth Daily., Disp: , Rfl:   •  Multiple Vitamins-Minerals (MULTIVITAMIN PO), Take 1 tablet by mouth daily., Disp: , Rfl:   •  naproxen sodium (ALEVE) 220 MG tablet, Take 440 mg by mouth daily as needed for mild pain (1-3)., Disp: , Rfl:   •  ascorbic acid (VITAMIN C) 1000 MG tablet, Take 1 tablet by mouth Daily., Disp: 90 tablet, Rfl: 0    HPI    Conner Dunn is a 59 y.o. male who presents today for annual follow up of nonischemic cardiomyopathy, hypertension. Since last visit, he has been feeling well overall from a cardiovascular standpoint. Patient denies chest pain, shortness of breath, edema, dizziness, and syncope. He monitors his BP at home, with readings typically around 130//85, and occasionally in the 140's systolic.      The following portions of the patient's history were reviewed and updated as appropriate: allergies, current medications and problem list.    Pertinent positives as listed in the HPI.  All other systems reviewed are negative.         Vitals:    01/30/20 1314   BP: 152/86   BP Location: Right arm   Patient Position: Sitting   Pulse: 74   Weight: 119 kg (262 lb)   Height: 185.4 cm (73\")       Physical Exam:  General: Alert and oriented.  Neck: Jugular venous pressure is within normal limits. Carotids have normal upstrokes without bruits.   Cardiovascular: Heart has a nondisplaced focal PMI. Regular rate and rhythm without murmur, gallop or rub.  Lungs: Clear without rales or wheezes. Equal expansion is noted.   Extremities: Show no edema.  Skin: Warm and dry.  Neurologic: Nonfocal.     Diagnostic Data:      ECG 12 Lead  Date/Time: 1/30/2020 1:23 PM  Performed by: Kira Scanlon MD  Authorized by: Kira Scanlon MD   Comparison: compared with previous ECG from 9/9/2016  Comparison to previous ECG: Pt was in " sinus [place] at 58 bpm, now in SR with frequent PVCs.  Rhythm: sinus rhythm  BPM: 74  Conduction: conduction normal  ST Segments: ST segments normal  T Waves: T waves normal  QRS axis: normal    Clinical impression: abnormal EKG  Comments: SR with frequent premature ventricular complexes              Assessment:    ICD-10-CM ICD-9-CM   1. Nonischemic cardiomyopathy (CMS/HCC) I42.8 425.4   2. Essential hypertension I10 401.9   3. Premature ventricular contractions I49.3 427.69     Lab results and ECG found above were reviewed with the patient.      Plan:  1. 24h Holter to reassess PVC burden.  2. Increase lisinopril from 10 to 20 mg daily for better control of blood pressure.  3. Continue carvedilol for NICM and HTN.  4. Continue lisinopril for hypertension.  5. Continue all other current medications.  6. F/up in 12 months, sooner if needed.      Scribed for Kira Scanlon MD by Elvira Guzmán. 1/30/2020  1:23 PM     I Kira Scanlon MD personally performed the services described in this documentation as scribed by the above individual in my presence, and it is both accurate and complete.    Kira Scanlon MD, [facility]

## 2020-02-07 ENCOUNTER — TELEPHONE (OUTPATIENT)
Dept: CARDIOLOGY | Facility: CLINIC | Age: 60
End: 2020-02-07

## 2020-02-07 DIAGNOSIS — G47.30 SLEEP APNEA WITH USE OF CONTINUOUS POSITIVE AIRWAY PRESSURE (CPAP): Primary | ICD-10-CM

## 2020-02-07 RX ORDER — CARVEDILOL 12.5 MG/1
12.5 TABLET ORAL 2 TIMES DAILY
Qty: 60 TABLET | Refills: 11
Start: 2020-02-07 | End: 2020-02-21 | Stop reason: SDUPTHER

## 2020-02-07 NOTE — TELEPHONE ENCOUNTER
Monitor reviewed by [facility] - 11.3% PVC burden, per [facility] increase carvedilol to 12.5 mg BID. Left message for pt to return call to discuss.     Pt returned call 3:10 pm  Pt is agreeable to increase carvedilol. He wonders if he needs evaluation of CPAP due to these findings.  He is CPAP compliant with a nasal appliance. He is not followed by sleep medicine.  Ok to send for sleep eval?

## 2020-02-21 RX ORDER — CARVEDILOL 12.5 MG/1
12.5 TABLET ORAL 2 TIMES DAILY
Qty: 180 TABLET | Refills: 3
Start: 2020-02-21 | End: 2020-02-24 | Stop reason: SDUPTHER

## 2020-02-21 RX ORDER — LISINOPRIL 20 MG/1
20 TABLET ORAL DAILY
Qty: 90 TABLET | Refills: 3 | Status: SHIPPED | OUTPATIENT
Start: 2020-02-21 | End: 2020-12-07

## 2020-02-24 RX ORDER — CARVEDILOL 12.5 MG/1
12.5 TABLET ORAL 2 TIMES DAILY
Qty: 180 TABLET | Refills: 3
Start: 2020-02-24 | End: 2020-03-04 | Stop reason: SDUPTHER

## 2020-03-04 RX ORDER — CARVEDILOL 12.5 MG/1
12.5 TABLET ORAL 2 TIMES DAILY
Qty: 180 TABLET | Refills: 3
Start: 2020-03-04 | End: 2020-03-05 | Stop reason: SDUPTHER

## 2020-03-05 RX ORDER — CARVEDILOL 12.5 MG/1
12.5 TABLET ORAL 2 TIMES DAILY
Qty: 180 TABLET | Refills: 3
Start: 2020-03-05 | End: 2021-03-04 | Stop reason: SDUPTHER

## 2020-03-05 RX ORDER — CARVEDILOL 12.5 MG/1
12.5 TABLET ORAL 2 TIMES DAILY
Qty: 180 TABLET | Refills: 3
Start: 2020-03-05 | End: 2020-03-05 | Stop reason: SDUPTHER

## 2020-04-29 ENCOUNTER — TELEMEDICINE (OUTPATIENT)
Dept: SLEEP MEDICINE | Facility: HOSPITAL | Age: 60
End: 2020-04-29

## 2020-04-29 DIAGNOSIS — E66.3 OVERWEIGHT: ICD-10-CM

## 2020-04-29 DIAGNOSIS — G47.33 OBSTRUCTIVE SLEEP APNEA: Primary | ICD-10-CM

## 2020-04-29 PROCEDURE — 99203 OFFICE O/P NEW LOW 30 MIN: CPT | Performed by: INTERNAL MEDICINE

## 2020-04-30 NOTE — PROGRESS NOTES
"Subjective   Conner Dunn is a 59 y.o. male is being seen for consultation today at the request of KAYCEE Ellis for the evaluation of snoring and obstructive sleep his primary care physician is Dr. Jordan. You have chosen to receive care through a telehealth visit.  Do you consent to use a video/audio connection for your medical care today? Yes    History of Present Illness  Patient states he had issues with PVCs noted since 2011.  Is found at that time to have cardiac myopathy.  He had a significant decrease in his ejection fraction at that time.  He was evaluated locally and also at the [facility].  He had evaluation at that time for obstructive sleep apnea and was found to have severe sleep apnea and is been on CPAP since then.  He says his cardiac function improved with medical therapy.  Recent ejection fraction is normal.  He has been noted to have some PVCs recently and is referred for reassessment of his sleep status..      Uses CPAP nightly.  He does not feel he can sleep without it.  Prior to going on CPAP he says his wife with sleep in the room due to his upper night.  He says now with his machine he sleeps 6 to 7 hours in addition to the bathroom but generally feels rested.  He thinks his weight is similar to what it was when he had his original test.  He uses nasal pillows mask.  His wife noted him to have occasional \"whistling\" noise but it does not awaken him.  He denies having any snoring when he is wearing his mask.  He denies waking with dry mouth.  He is generally rested in order.  He denies any sleepy during the day or having trouble driving.  He denies having morning headaches.  He broke his nose when he was 24 years old and had surgery but does not have any trouble breathing through his nose.    He denies any history of reflux symptoms he denies hypnagogic hallucinations or sleep paralysis.  He denies kicking or jerking his legs at night.  He does have occasional back pain. "  Without the machine he did have mild snoring in the canal and had noted apneas.  He had morning headaches before he got his machine.    He goes to bed at 11 to 11:30 PM.  He will fall asleep in 20 to 30-minute.  He says he may awaken once during the night to go to the bathroom.  He thinks he gets 6 to 7 hours of sleep and feels rested seen.  He has had hypertension known for 9 years as well as history of cardiomyopathy.  He apparently does not have obstructive coronary disease.  He denies any history of diabetes.  Allergies   Allergen Reactions   • Ibuprofen           Current Outpatient Medications:   •  ascorbic acid (VITAMIN C) 1000 MG tablet, Take 1 tablet by mouth Daily., Disp: 90 tablet, Rfl: 0  •  aspirin 81 MG EC tablet, Take 81 mg by mouth daily., Disp: , Rfl:   •  carvedilol (COREG) 12.5 MG tablet, Take 1 tablet by mouth 2 (Two) Times a Day., Disp: 180 tablet, Rfl: 3  •  lisinopril (PRINIVIL,ZESTRIL) 20 MG tablet, Take 1 tablet by mouth Daily., Disp: 90 tablet, Rfl: 3  •  Methylcellulose, Laxative, (CITRUCEL PO), Take  by mouth Daily., Disp: , Rfl:   •  Multiple Vitamins-Minerals (MULTIVITAMIN PO), Take 1 tablet by mouth daily., Disp: , Rfl:   •  naproxen sodium (ALEVE) 220 MG tablet, Take 440 mg by mouth daily as needed for mild pain (1-3)., Disp: , Rfl:     Social History    Tobacco Use      Smoking status: Never Smoker       Social History     Substance and Sexual Activity   Alcohol Use  he estimates 1 drink per month       Caffeine: He has 3 cups of coffee per day with one tea and occasional soda.    Past Medical History:   Diagnosis Date   • Acute calculous cholecystitis 9/9/2016   • Cardiomyopathy (CMS/HCC)    • Essential hypertension 1/23/2019   • Hypertension    • Metabolic syndrome 12/16/2016   • Nonischemic cardiomyopathy (CMS/HCC) 12/16/2016    History of abnormal echocardiogram, 02/21/2011, revealing LVEF (26%) with moderate to severe global hypokinesis, moderate LVH, left atrium of 4.8 cm.  Cardiolite GXT, 02/21/2011, Dr. Hernandez, revealing ejection fraction of 26%.  Patient walked for 8 minutes and 28 seconds, achieving 86% of target heart rate.  Negative for chest pain.  PVCs at rest, resolving with exercise.  No evidence of reversible ischemia. Cardiac catheterization, 02/28/2011, Kira Scanlon MD, revealing LVEF (20% to 25%) with mild mitral regurgitation.  Normal coronary arteries. Evaluation at the [facility] in April 2011 with an echocardiogram revealing an ejection fraction of 30%.  Holter monitor, April 2011, revealing 11,000 PVCs in a 24-hour period of time. Initiation of amiodarone therapy. Repeat echocardiogram, August 2011 at the [facility] revealed an ejection fraction of 51%.  Holter revealed rare PVCs. Discontinuation of amiodarone therapy. Echocardiogram, December 2011, [facility], with an LVEF (61%), with a Hol   • Obstructive sleep apnea 12/16/2016    Severe obstructive sleep apnea, diagnosed in April 2011, currently uses CPAP on a nightly basis.   • Renal insufficiency 12/16/2016   • Sleep apnea        Past Surgical History:   Procedure Laterality Date   • CARDIAC CATHETERIZATION     • CHOLECYSTECTOMY N/A 9/9/2016    Procedure: CHOLECYSTECTOMY LAPAROSCOPIC;  Surgeon: Blas Boyd MD;  Location: [facility];  Service:    • HERNIA REPAIR     • NOSE SURGERY         Family History   Problem Relation Age of Onset   • Alzheimer's disease Mother    • Endocrine tumor Father    • Cancer Sister         breast   Family history is positive for hypertension, asthma, thyroid problems.    The following portions of the patient's history were reviewed and updated as appropriate: allergies, current medications, past family history, past medical history, past social history, past surgical history and problem list.    Review of Systems   Constitutional: Negative.    HENT: Negative.    Eyes: Negative.    Respiratory:        He had a history of asthma as a child but has not had recent  symptoms   Cardiovascular: Positive for palpitations.   Gastrointestinal: Negative.    Endocrine: Negative.    Genitourinary: Positive for frequency.   Musculoskeletal: Positive for back pain.   Skin: Negative.    Allergic/Immunologic: Negative.    Neurological: Negative.    Hematological: Negative.    Psychiatric/Behavioral: Negative.    [place] score is 8/24    Objective     There were no vitals taken for this visit.     Physical Exam  Patient is awake and alert.  He does not appear to be in respiratory distress.  His stated weight is 250 pounds.  His height is 6 feet 1 inches.  That would give him a body mass index of 33.  He has Mallampati class IV anatomy.    His diagnostic study on April 13, 2011 showed an AHI of 34.9.  His weight at 250 pounds.  He titrated to CPAP of 10.    Download from his machine now shows use 100% of the time for the past 30 days.  His AHI is 5.5.    Assessment/Plan   Diagnoses and all orders for this visit:    Obstructive sleep apnea  -     Detailed CPAP Order    Overweight    Patient has a history of severe obstructive sleep apnea.  His weight at the time of his study was the same as it is now.  I suspect that he still has severe obstructive sleep apnea.  His download shows excellent control of his respiratory events on his current pressures.  He is interested in continuing with his CPAP.  We will continue on his current pressures and renew his supplies.  We have discussed other possible therapies including weight control, oral appliance, and surgery.  We have further discussed the long-term consequences of untreated obstruction.  He is encouraged to lose weight.  He is encouraged to avoid alcohol and sedatives close to bedtime.  He is encouraged to practice position sleep.    His machine is rather old.  We have discussed ordering him a new machine but he wishes to wait on it at this time since it is still working.  He is to let us know if he wishes to get a new machine.  Otherwise, we  will  plan to see him back in 1 year.    Total time: 30 minutes exclusive of procedures.         Burak Henry MD [facility]  Sleep Medicine  Pulmonary and Critical Care Medicine

## 2020-07-24 ENCOUNTER — TELEMEDICINE (OUTPATIENT)
Dept: SLEEP MEDICINE | Facility: HOSPITAL | Age: 60
End: 2020-07-24

## 2020-07-24 DIAGNOSIS — E66.3 OVERWEIGHT: ICD-10-CM

## 2020-07-24 DIAGNOSIS — G47.33 OBSTRUCTIVE SLEEP APNEA, ADULT: Primary | ICD-10-CM

## 2020-07-24 PROCEDURE — 99213 OFFICE O/P EST LOW 20 MIN: CPT | Performed by: INTERNAL MEDICINE

## 2020-07-24 NOTE — PATIENT INSTRUCTIONS

## 2020-07-25 NOTE — PROGRESS NOTES
Subjective   Conner Dunn is a 59 y.o. male is here today for follow-up.  He is seen for follow-up of obstructive sleep apnea.  His primary care physician is Dr. Jordan.  He is also seen by Dr. Knight.  You have chosen to receive care through a telehealth visit.  Do you consent to use a video/audio connection for your medical care today? Yes    History of Present Illness  Patient was last seen in clinic April 29 history of severe obstructive sleep apnea and previous history of cardiomyopathy.  He has hypertension and obesity.  Did decide to get a new machine and returns for follow-up.  He says he is feeling better with his machine.  He tried several masks.  He did not like a fullface mask and is now back to nasal pillows.  He has been using his machine nightly.  He admits he has lost 12 pounds since April he is exercising more.  Past Medical History:   Diagnosis Date   • Acute calculous cholecystitis 9/9/2016   • Cardiomyopathy (CMS/HCC)    • Essential hypertension 1/23/2019   • Hypertension    • Metabolic syndrome 12/16/2016   • Nonischemic cardiomyopathy (CMS/HCC) 12/16/2016    History of abnormal echocardiogram, 02/21/2011, revealing LVEF (26%) with moderate to severe global hypokinesis, moderate LVH, left atrium of 4.8 cm. Cardiolite GXT, 02/21/2011, Dr. Hernandez, revealing ejection fraction of 26%.  Patient walked for 8 minutes and 28 seconds, achieving 86% of target heart rate.  Negative for chest pain.  PVCs at rest, resolving with exercise.  No evidence of reversible ischemia. Cardiac catheterization, 02/28/2011, Kira Scanlon MD, revealing LVEF (20% to 25%) with mild mitral regurgitation.  Normal coronary arteries. Evaluation at the [facility] in April 2011 with an echocardiogram revealing an ejection fraction of 30%.  Holter monitor, April 2011, revealing 11,000 PVCs in a 24-hour period of time. Initiation of amiodarone therapy. Repeat echocardiogram, August 2011 at the [facility]  revealed an ejection fraction of 51%.  Holter revealed rare PVCs. Discontinuation of amiodarone therapy. Echocardiogram, December 2011, [facility], with an LVEF (61%), with a Hol   • Obstructive sleep apnea 12/16/2016    Severe obstructive sleep apnea, diagnosed in April 2011, currently uses CPAP on a nightly basis.   • Renal insufficiency 12/16/2016   • Sleep apnea        Past Surgical History:   Procedure Laterality Date   • CARDIAC CATHETERIZATION     • CHOLECYSTECTOMY N/A 9/9/2016    Procedure: CHOLECYSTECTOMY LAPAROSCOPIC;  Surgeon: Blas Boyd MD;  Location: [facility];  Service:    • HERNIA REPAIR     • NOSE SURGERY             Current Outpatient Medications:   •  ascorbic acid (VITAMIN C) 1000 MG tablet, Take 1 tablet by mouth Daily., Disp: 90 tablet, Rfl: 0  •  aspirin 81 MG EC tablet, Take 81 mg by mouth daily., Disp: , Rfl:   •  carvedilol (COREG) 12.5 MG tablet, Take 1 tablet by mouth 2 (Two) Times a Day., Disp: 180 tablet, Rfl: 3  •  lisinopril (PRINIVIL,ZESTRIL) 20 MG tablet, Take 1 tablet by mouth Daily., Disp: 90 tablet, Rfl: 3  •  Methylcellulose, Laxative, (CITRUCEL PO), Take  by mouth Daily., Disp: , Rfl:   •  Multiple Vitamins-Minerals (MULTIVITAMIN PO), Take 1 tablet by mouth daily., Disp: , Rfl:   •  naproxen sodium (ALEVE) 220 MG tablet, Take 440 mg by mouth daily as needed for mild pain (1-3)., Disp: , Rfl:     Allergies   Allergen Reactions   • Ibuprofen        The following portions of the patient's history were reviewed and updated as appropriate: allergies, current medications and problem list.    Review of Systems   Constitutional: Negative.    HENT: Negative.    Eyes: Negative.    Respiratory: Negative.    Cardiovascular: Positive for palpitations.   Endocrine: Negative.    Genitourinary: Positive for frequency.   Musculoskeletal: Positive for back pain.   Skin: Negative.    Allergic/Immunologic: Negative.    Neurological: Negative.    Hematological: Negative.     Psychiatric/Behavioral: Negative.    [place] score is 3/24.    Objective     There were no vitals taken for this visit.    Physical Exam  Patient appears awake and alert.  He does not appear to be in respiratory distress but stated weight is 248 pounds.  His height 6 feet 1 inches.  His body mass at 32.    Download from his machine for the past 2 months usage 98% of the time.  He is using it 6 hours 40 minutes per night.  His AHI is still 8.3.  He is on a CPAP of 8.    Assessment/Plan   Diagnoses and all orders for this visit:    Obstructive sleep apnea, adult  -     Detailed CPAP Order    Overweight    Patient seems to be doing fairly well with his new CPAP machine.  He does still have AHI slight normal.  He apparently titrated is slightly higher level previously.  We will increase his CPAP to 10.  He is to let us know if he has problems at this pressure. we will renew his supplies.  We will plan to see him back in 1 year.  He is encouraged to continue to lose weight.  He is encouraged to avoid alcohol and to bedtime.  He is encouraged to practice lateral position sleep.  He is to contact us if symptoms worsen.    Total time: 15 minutes exclusive of procedures.             Burak Henry MD [facility]  Sleep Medicine  Pulmonary and Critical Care Medicine      07/25/20  10:01 AM

## 2020-11-30 ENCOUNTER — APPOINTMENT (OUTPATIENT)
Dept: PREADMISSION TESTING | Facility: HOSPITAL | Age: 60
End: 2020-11-30

## 2020-11-30 LAB — SARS-COV-2 RNA RESP QL NAA+PROBE: NOT DETECTED

## 2020-11-30 PROCEDURE — U0004 COV-19 TEST NON-CDC HGH THRU: HCPCS

## 2020-11-30 PROCEDURE — C9803 HOPD COVID-19 SPEC COLLECT: HCPCS

## 2020-12-02 ENCOUNTER — OFFICE (OUTPATIENT)
Dept: URBAN - METROPOLITAN AREA PATHOLOGY 4 | Facility: PATHOLOGY | Age: 60
End: 2020-12-02
Payer: COMMERCIAL

## 2020-12-02 ENCOUNTER — AMBULATORY SURGICAL CENTER (OUTPATIENT)
Dept: URBAN - METROPOLITAN AREA SURGERY 10 | Facility: SURGERY | Age: 60
End: 2020-12-02
Payer: COMMERCIAL

## 2020-12-02 DIAGNOSIS — K63.5 POLYP OF COLON: ICD-10-CM

## 2020-12-02 DIAGNOSIS — D12.3 BENIGN NEOPLASM OF TRANSVERSE COLON: ICD-10-CM

## 2020-12-02 DIAGNOSIS — Z86.010 PERSONAL HISTORY OF COLONIC POLYPS: ICD-10-CM

## 2020-12-02 DIAGNOSIS — K57.90 DIVERTICULOSIS OF INTESTINE, PART UNSPECIFIED, WITHOUT PERFO: ICD-10-CM

## 2020-12-02 PROCEDURE — 88305 TISSUE EXAM BY PATHOLOGIST: CPT | Mod: 33 | Performed by: INTERNAL MEDICINE

## 2020-12-02 PROCEDURE — 45385 COLONOSCOPY W/LESION REMOVAL: CPT | Mod: 33 | Performed by: INTERNAL MEDICINE

## 2020-12-07 RX ORDER — LISINOPRIL 20 MG/1
20 TABLET ORAL DAILY
Qty: 90 TABLET | Refills: 0 | Status: SHIPPED | OUTPATIENT
Start: 2020-12-07 | End: 2021-02-26

## 2021-02-03 ENCOUNTER — OFFICE VISIT (OUTPATIENT)
Dept: CARDIOLOGY | Facility: CLINIC | Age: 61
End: 2021-02-03

## 2021-02-03 VITALS
WEIGHT: 254.6 LBS | OXYGEN SATURATION: 97 % | SYSTOLIC BLOOD PRESSURE: 118 MMHG | HEIGHT: 73 IN | HEART RATE: 56 BPM | TEMPERATURE: 97.5 F | BODY MASS INDEX: 33.74 KG/M2 | DIASTOLIC BLOOD PRESSURE: 78 MMHG

## 2021-02-03 DIAGNOSIS — I49.3 PREMATURE VENTRICULAR CONTRACTIONS: ICD-10-CM

## 2021-02-03 DIAGNOSIS — I42.8 NONISCHEMIC CARDIOMYOPATHY (HCC): Primary | ICD-10-CM

## 2021-02-03 DIAGNOSIS — I10 ESSENTIAL HYPERTENSION: ICD-10-CM

## 2021-02-03 PROCEDURE — 99214 OFFICE O/P EST MOD 30 MIN: CPT | Performed by: INTERNAL MEDICINE

## 2021-02-03 NOTE — PROGRESS NOTES
[facility] Cardiology    Patient ID: Conner Dunn is a 60 y.o. male.  : 1960   Contact: [phone]    Encounter date: 2021    PCP: Nathaniel Jordan MD      Chief complaint:   Chief Complaint   Patient presents with   • Nonischemic cardiomyopathy   • Essential Hypertension     Problem List:  1. Nonischemic cardiomyopathy:  a. Echocardiogram, 2011: EF 26% with moderate to severe global  hypokinesis, moderate LVH, left atrium of 4.8 cm.  b. Cardiolite GXT, 2011, Dr. Hernandez: EF 26%. Patient walked for 8:28, achieving 86% of THR. Negative for chest pain. PVCs at rest, resolving with exercise. No evidence of reversible ischemia.  c. LHC, 2011, [facility]: EF 20-25%. Mild MR. Normal coronary arteries.  d. Evaluation at the [facility] in  2011 with an echocardiogram revealing EF 30%.   e. Holter monitor, 2011, revealing 11,000 PVCs in a 24-hour period of time.  f. Initiation of amiodarone therapy.  g. Echocardiogram, 2011 at the [facility]: EF 51%. Holter revealed rare PVCs.  h. Discontinuation of amiodarone therapy.  i. Echocardiogram, 2011, [facility]: EF 61%  j. 24h Holter monitor, Dec 2011: 3% PVCs.  k. Echocardiogram, 2014: EF 55-60%. Trace MR/TR.  l. Echocardiogram, 2017: EF 55-60%. Trace MR/TR.  m. Echocardiogram, 2020: EF 55-60%. Trace MR/TR. LV cavity mildly dilated.  2. PVC's  a. Holter monitor, 2020: Monitored for 1 day, 1 hour, and 22 minutes. 11.3% PVC's. (Increase Coreg to 12.5 mg BID.)  3. Severe DARREL  a. Uses CPAP, 2/3/21  4. Metabolic syndrome.  5. History of mild renal insufficiency.  6. Mild-to-moderate ascending aortic dilatation, 42 mm ([facility]).  7. Cholecystectomy, 2016.     Allergies   Allergen Reactions   • Ibuprofen        Current Medications:    Current Outpatient Medications:   •  ascorbic acid (VITAMIN C) 1000 MG tablet, Take 1 tablet by mouth Daily., Disp: 90  "tablet, Rfl: 0  •  aspirin 81 MG EC tablet, Take 81 mg by mouth daily., Disp: , Rfl:   •  carvedilol (COREG) 12.5 MG tablet, Take 1 tablet by mouth 2 (Two) Times a Day., Disp: 180 tablet, Rfl: 3  •  lisinopril (PRINIVIL,ZESTRIL) 20 MG tablet, TAKE 1 TABLET BY MOUTH  DAILY, Disp: 90 tablet, Rfl: 0  •  Methylcellulose, Laxative, (CITRUCEL PO), Take  by mouth Daily., Disp: , Rfl:   •  Multiple Vitamins-Minerals (MULTIVITAMIN PO), Take 1 tablet by mouth daily., Disp: , Rfl:   •  naproxen sodium (ALEVE) 220 MG tablet, Take 440 mg by mouth daily as needed for mild pain (1-3)., Disp: , Rfl:     HPI    Conner Dunn is a 60 y.o. male who presents today for an annual follow up of nonischemic cardiomyopathy, PVC's, and cardiac risk factors. Since last visit, patient has done well from CV standpoint. No palpitations, chest pain, sob, MARK, unexplained weight gain, PND, orthopnea. Compliant with meds. BP controlled.     The following portions of the patient's history were reviewed and updated as appropriate: allergies, current medications and problem list.    Pertinent positives as listed in the HPI.  All other systems reviewed are negative.         Vitals:    02/03/21 1546   BP: 118/78   BP Location: Left arm   Patient Position: Sitting   Pulse: 56   Temp: 97.5 °F (36.4 °C)   SpO2: 97%   Weight: 115 kg (254 lb 9.6 oz)   Height: 185.4 cm (73\")       Physical Exam:  General: Alert and oriented.  Neck: Jugular venous pressure is within normal limits. Carotids have normal upstrokes without bruits.   Cardiovascular: Heart has a nondisplaced focal PMI. Regular rate and rhythm. No murmur, gallop or rub.  Lungs: Clear, no rales or wheezes. Equal expansion is noted.   Extremities: Show no edema.  Skin: Warm and dry.  Neurologic: Nonfocal.     Diagnostic Data (reviewed with patient):  Lab date: 05/11/2020  • FLP: , , HDL 38,   • BMP: Glu 121, BUN 18, Creat 1.37, eGFR 55, Na 141, K 4.4, Cl 107, CO2 21, Ca " 9.6      Procedures      Assessment:    ICD-10-CM ICD-9-CM   1. Nonischemic cardiomyopathy (CMS/HCC)  I42.8 425.4   2. Essential hypertension  I10 401.9   3. Premature ventricular contractions  I49.3 427.69         Plan:  1. Continue ACE, BB for cardiomyopathy and HTN.   2. Continue Coreg for PVCs.   3. Continue all other current medications.  4. F/up in 12 months, sooner if needed.      Electronically signed by BRIDGETTE Kenney, 02/03/21, 4:20 PM EST.

## 2021-02-26 RX ORDER — LISINOPRIL 20 MG/1
20 TABLET ORAL DAILY
Qty: 90 TABLET | Refills: 3 | Status: SHIPPED | OUTPATIENT
Start: 2021-02-26 | End: 2022-03-02 | Stop reason: SDUPTHER

## 2021-03-05 RX ORDER — CARVEDILOL 12.5 MG/1
12.5 TABLET ORAL 2 TIMES DAILY
Qty: 180 TABLET | Refills: 3 | Status: SHIPPED | OUTPATIENT
Start: 2021-03-05 | End: 2021-03-08 | Stop reason: SDUPTHER

## 2021-03-09 RX ORDER — CARVEDILOL 12.5 MG/1
12.5 TABLET ORAL 2 TIMES DAILY
Qty: 60 TABLET | Refills: 0 | Status: SHIPPED | OUTPATIENT
Start: 2021-03-09 | End: 2021-04-05

## 2021-04-05 RX ORDER — CARVEDILOL 12.5 MG/1
TABLET ORAL
Qty: 60 TABLET | Refills: 1 | Status: SHIPPED | OUTPATIENT
Start: 2021-04-05 | End: 2022-01-03

## 2021-07-26 ENCOUNTER — OFFICE VISIT (OUTPATIENT)
Dept: SLEEP MEDICINE | Facility: HOSPITAL | Age: 61
End: 2021-07-26

## 2021-07-26 VITALS
WEIGHT: 256.2 LBS | BODY MASS INDEX: 33.95 KG/M2 | SYSTOLIC BLOOD PRESSURE: 125 MMHG | DIASTOLIC BLOOD PRESSURE: 80 MMHG | HEIGHT: 73 IN | OXYGEN SATURATION: 96 % | HEART RATE: 65 BPM

## 2021-07-26 DIAGNOSIS — G47.33 OSA (OBSTRUCTIVE SLEEP APNEA): Primary | ICD-10-CM

## 2021-07-26 PROCEDURE — 99213 OFFICE O/P EST LOW 20 MIN: CPT | Performed by: NURSE PRACTITIONER

## 2021-07-26 NOTE — PROGRESS NOTES
Chief Complaint:   Chief Complaint   Patient presents with   • Follow-up       HPI:    Conner Dunn is a 60 y.o. male here for follow-up of sleep apnea.  Patient was last seen 2020-07-24 for severe obstructive sleep apnea.  Patient states he continues to do very well with CPAP therapy.  Patient is sleeping 6-1/2 hours nightly and does feel rested upon awakening.  Patient will go to sleep usually very quickly and will get up x1 in the night to use the restroom.  Patient has an [place] score of 3/24.  Patient has no concerns or complaints regarding CPAP therapy and wishes to continue.        Current medications are:   Current Outpatient Medications:   •  ascorbic acid (VITAMIN C) 1000 MG tablet, Take 1 tablet by mouth Daily., Disp: 90 tablet, Rfl: 0  •  aspirin 81 MG EC tablet, Take 81 mg by mouth daily., Disp: , Rfl:   •  carvedilol (COREG) 12.5 MG tablet, TAKE ONE TABLET BY MOUTH TWICE A DAY, Disp: 60 tablet, Rfl: 1  •  lisinopril (PRINIVIL,ZESTRIL) 20 MG tablet, TAKE 1 TABLET BY MOUTH  DAILY, Disp: 90 tablet, Rfl: 3  •  Methylcellulose, Laxative, (CITRUCEL PO), Take  by mouth Daily., Disp: , Rfl:   •  Multiple Vitamins-Minerals (MULTIVITAMIN PO), Take 1 tablet by mouth daily., Disp: , Rfl:   •  naproxen sodium (ALEVE) 220 MG tablet, Take 440 mg by mouth daily as needed for mild pain (1-3)., Disp: , Rfl: .      The patient's relevant past medical, surgical, family and social history were reviewed and updated in Epic as appropriate.       Review of Systems   Respiratory: Positive for apnea.    Cardiovascular: Positive for palpitations.   Genitourinary: Positive for frequency.   Psychiatric/Behavioral: Positive for sleep disturbance.   All other systems reviewed and are negative.        Objective:    Physical Exam  Vitals reviewed.   Constitutional:       Appearance: Normal appearance.   HENT:      Head: Normocephalic and atraumatic.      Mouth/Throat:      Mouth: Mucous membranes are moist.      Pharynx:  Oropharynx is clear.      Comments: Mallampati 3 anatomy  Eyes:      Conjunctiva/sclera: Conjunctivae normal.      Pupils: Pupils are equal, round, and reactive to light.   Cardiovascular:      Rate and Rhythm: Normal rate and regular rhythm.   Pulmonary:      Effort: Pulmonary effort is normal.      Breath sounds: Normal breath sounds.   Skin:     General: Skin is warm and dry.   Neurological:      Mental Status: He is alert and oriented to person, place, and time.   Psychiatric:         Mood and Affect: Mood normal.         Behavior: Behavior normal.         Thought Content: Thought content normal.         Judgment: Judgment normal.       90/90 days of use  Greater than 4-hour use 100%  CPAP 10 cm H2O  AHI 1.1    ASSESSMENT/PLAN    Diagnoses and all orders for this visit:    1. DARREL (obstructive sleep apnea) (Primary)  -     CPAP Therapy            1. Counseled patient regarding multimodal approach with healthy nutrition, healthy sleep, regular physical activity, social activities, counseling, and medications. Encouraged to practice lateral  sleep position. Avoid alcohol and sedatives close to bedtime.  2. Refill supplies x1 year.  Return to clinic 1 year or sooner symptoms warrant.    I have reviewed the results of my evaluation and impression and discussed my recommendations in detail with the patient.      Signed by  BRIDGETTE Dumont    July 26, 2021      CC: Nathaniel Jordan MD          No ref. provider found

## 2022-01-03 RX ORDER — CARVEDILOL 12.5 MG/1
TABLET ORAL
Qty: 180 TABLET | Refills: 3 | Status: SHIPPED | OUTPATIENT
Start: 2022-01-03 | End: 2022-12-09

## 2022-03-02 ENCOUNTER — OFFICE VISIT (OUTPATIENT)
Dept: CARDIOLOGY | Facility: CLINIC | Age: 62
End: 2022-03-02

## 2022-03-02 VITALS
HEIGHT: 73 IN | OXYGEN SATURATION: 95 % | HEART RATE: 72 BPM | SYSTOLIC BLOOD PRESSURE: 126 MMHG | WEIGHT: 257 LBS | DIASTOLIC BLOOD PRESSURE: 72 MMHG | BODY MASS INDEX: 34.06 KG/M2

## 2022-03-02 DIAGNOSIS — I42.8 NONISCHEMIC CARDIOMYOPATHY: Primary | ICD-10-CM

## 2022-03-02 DIAGNOSIS — I49.3 PREMATURE VENTRICULAR CONTRACTIONS: ICD-10-CM

## 2022-03-02 DIAGNOSIS — I10 ESSENTIAL HYPERTENSION: ICD-10-CM

## 2022-03-02 PROCEDURE — 99213 OFFICE O/P EST LOW 20 MIN: CPT | Performed by: INTERNAL MEDICINE

## 2022-03-02 RX ORDER — LISINOPRIL 20 MG/1
20 TABLET ORAL DAILY
Qty: 90 TABLET | Refills: 3 | Status: SHIPPED | OUTPATIENT
Start: 2022-03-02 | End: 2023-03-29 | Stop reason: SDUPTHER

## 2022-03-02 NOTE — PROGRESS NOTES
[facility] Cardiology    Patient ID: Conner Dunn is a 61 y.o. male.  : 1960   Contact: [phone]    Encounter date: 2022    PCP: Nathaniel Jordan MD      Chief complaint:   Chief Complaint   Patient presents with   • Nonischemic cardiomyopathy (CMS/HCC)     Problem List:  1. Nonischemic cardiomyopathy:  a. Echocardiogram, 2011: EF 26% with moderate to severe global  hypokinesis, moderate LVH, left atrium of 4.8 cm.  b. Cardiolite GXT, 2011, Dr. Hernandez: EF 26%. Patient walked for 8:28, achieving 86% of THR. Negative for chest pain. PVCs at rest, resolving with exercise. No evidence of reversible ischemia.  c. LHC, 2011, [facility]: EF 20-25%. Mild MR. Normal coronary arteries.  d. Evaluation at the [facility] in  2011 with an echocardiogram revealing EF 30%.   e. Holter monitor, 2011, revealing 11,000 PVCs in a 24-hour period of time.  f. Initiation of amiodarone therapy.  g. Echocardiogram, 2011 at the [facility]: EF 51%. Holter revealed rare PVCs.  h. Discontinuation of amiodarone therapy.  i. Echocardiogram, 2011, [facility]: EF 61%  j. 24h Holter monitor, Dec 2011: 3% PVCs.  k. Echocardiogram, 2014: EF 55-60%. Trace MR/TR.  l. Echocardiogram, 2017: EF 55-60%. Trace MR/TR.  m. Echocardiogram, 2020: EF 55-60%. Trace MR/TR. LV cavity mildly dilated.  2. PVC's  a. Holter monitor, 2020: Monitored for 1 day, 1 hour, and 22 minutes. 11.3% PVC's. (Increase Coreg to 12.5 mg BID.)  3. Severe DARREL  a. Uses CPAP, 3/2/22  4. Metabolic syndrome.  5. History of mild renal insufficiency.  6. Mild-to-moderate ascending aortic dilatation, 42 mm ([facility]).  7. Cholecystectomy, 2016.     Allergies   Allergen Reactions   • Ibuprofen        Current Medications:    Current Outpatient Medications:   •  ascorbic acid (VITAMIN C) 1000 MG tablet, Take 1 tablet by mouth Daily., Disp: 90 tablet, Rfl: 0  •   "carvedilol (COREG) 12.5 MG tablet, TAKE 1 TABLET BY MOUTH  TWICE DAILY, Disp: 180 tablet, Rfl: 3  •  lisinopril (PRINIVIL,ZESTRIL) 20 MG tablet, TAKE 1 TABLET BY MOUTH  DAILY, Disp: 90 tablet, Rfl: 3  •  Methylcellulose, Laxative, (CITRUCEL PO), Take  by mouth As Needed., Disp: , Rfl:   •  Multiple Vitamins-Minerals (MULTIVITAMIN PO), Take 1 tablet by mouth daily., Disp: , Rfl:   •  naproxen sodium (ALEVE) 220 MG tablet, Take 440 mg by mouth daily as needed for mild pain (1-3)., Disp: , Rfl:     HPI    Conner Dunn is a 61 y.o. male who presents today for 12-month follow up of nonischemic cardiomyopathy, PVCs, and hypertension. Since last visit, patient has done well overall. No chest pain, SOA, PND, orthopnea. BP controlled. No complaints or issues.        The following portions of the patient's history were reviewed and updated as appropriate: allergies, current medications and problem list.    Pertinent positives as listed in the HPI.  All other systems reviewed are negative.         Vitals:    03/02/22 1607   BP: 126/72   BP Location: Left arm   Patient Position: Sitting   Cuff Size: Adult   Pulse: 72   SpO2: 95%   Weight: 117 kg (257 lb)   Height: 185.4 cm (73\")       Physical Exam:  General: Alert and oriented.  Neck: Jugular venous pressure is within normal limits. Carotids have normal upstrokes without bruits.   Cardiovascular: Heart has a nondisplaced focal PMI. Regular rate and rhythm. No murmur, gallop or rub.  Lungs: Clear, no rales or wheezes. Equal expansion is noted.   Extremities: Show no edema.  Skin: Warm and dry.  Neurologic: Nonfocal.     Diagnostic Data (reviewed with patient):    No recent laboratory studies available for review today.    Lab date: 11/20/2020  • FLP: , , HDL 39, LDL 83    Procedures      Assessment:    ICD-10-CM ICD-9-CM   1. Nonischemic cardiomyopathy (HCC)  I42.8 425.4   2. Premature ventricular contractions  I49.3 427.69   3. Essential hypertension  I10 " 401.9         Plan:  1. Continue Coreg, Lisinopril for NICMP, HTN  2. Continue Coreg for PVCs.  3. Patient was counseled to begin aerobic exercise 30 min per day for at least 4 days per week.   4. Continue all other current medications.  5. Echo in one year, before f/u.  6. F/up in 12 months, sooner if needed.      Scribed for Kira Scanlon MD by MAURA Vu, APRN. 3/2/2022  16:21 EST    I Kira Scnalon MD personally performed the services described in this documentation as scribed by the above individual in my presence, and it is both accurate and complete.    Kira Scanlon MD, FACC

## 2022-07-26 ENCOUNTER — OFFICE VISIT (OUTPATIENT)
Dept: SLEEP MEDICINE | Facility: HOSPITAL | Age: 62
End: 2022-07-26

## 2022-07-26 VITALS
HEART RATE: 61 BPM | WEIGHT: 260.4 LBS | DIASTOLIC BLOOD PRESSURE: 85 MMHG | SYSTOLIC BLOOD PRESSURE: 153 MMHG | BODY MASS INDEX: 34.51 KG/M2 | OXYGEN SATURATION: 95 % | HEIGHT: 73 IN

## 2022-07-26 DIAGNOSIS — G47.33 OSA (OBSTRUCTIVE SLEEP APNEA): Primary | ICD-10-CM

## 2022-07-26 PROCEDURE — 99213 OFFICE O/P EST LOW 20 MIN: CPT | Performed by: NURSE PRACTITIONER

## 2022-07-26 NOTE — PROGRESS NOTES
Chief Complaint:   Chief Complaint   Patient presents with   • Follow-up       HPI:    Conner Dunn is a 61 y.o. male here for follow-up of sleep apnea.  Patient has a history of nonischemic cardiomyopathy, PVCs, hypertension, metabolic syndrome, renal insufficiency and sleep apnea severe in nature.  Patient was last seen 7/26/2021.  Patient continues to do well with CPAP therapy.  He is sleeping 6-1/2 to 7 hours nightly and does feel rested upon awakening.  Patient goes to sleep quickly and does get up x1.  Patient has an [place] score of 6/24.  Patient has no concerns regarding CPAP therapy and wishes to continue.        Current medications are:   Current Outpatient Medications:   •  ascorbic acid (VITAMIN C) 1000 MG tablet, Take 1 tablet by mouth Daily., Disp: 90 tablet, Rfl: 0  •  carvedilol (COREG) 12.5 MG tablet, TAKE 1 TABLET BY MOUTH  TWICE DAILY, Disp: 180 tablet, Rfl: 3  •  lisinopril (PRINIVIL,ZESTRIL) 20 MG tablet, Take 1 tablet by mouth Daily., Disp: 90 tablet, Rfl: 3  •  Methylcellulose, Laxative, (CITRUCEL PO), Take  by mouth As Needed., Disp: , Rfl:   •  Multiple Vitamins-Minerals (MULTIVITAMIN PO), Take 1 tablet by mouth daily., Disp: , Rfl:   •  naproxen sodium (ALEVE) 220 MG tablet, Take 440 mg by mouth daily as needed for mild pain (1-3)., Disp: , Rfl: .      The patient's relevant past medical, surgical, family and social history were reviewed and updated in Epic as appropriate.       Review of Systems   Respiratory: Positive for apnea.    Cardiovascular: Positive for palpitations.   Genitourinary: Positive for frequency.   Psychiatric/Behavioral: Positive for sleep disturbance.   All other systems reviewed and are negative.        Objective:    Physical Exam  Constitutional:       Appearance: Normal appearance.   HENT:      Head: Normocephalic and atraumatic.      Mouth/Throat:      Comments: Class 3 airway  Cardiovascular:      Rate and Rhythm: Regular rhythm. Bradycardia present.  "  Pulmonary:      Effort: Pulmonary effort is normal.      Breath sounds: Normal breath sounds.   Skin:     General: Skin is warm and dry.   Neurological:      Mental Status: He is alert and oriented to person, place, and time.   Psychiatric:         Mood and Affect: Mood normal.         Behavior: Behavior normal.         Thought Content: Thought content normal.         Judgment: Judgment normal.     /85   Pulse 61   Ht 185.4 cm (73\")   Wt 118 kg (260 lb 6.4 oz)   SpO2 95%   BMI 34.36 kg/m²       CPAP Report  90/90 days of use  Greater than 4-hour use 100%  AHI of 1.2  CPAP setting 10 cm H2O      The patient continues to use and benefit from CPAP therapy.    ASSESSMENT/PLAN    Diagnoses and all orders for this visit:    1. DARREL (obstructive sleep apnea) (Primary)  -     PAP Therapy        1. Counseled patient regarding multimodal approach with healthy nutrition, healthy sleep, regular physical activity, social activities, counseling, and medications. Encouraged to practice lateral sleep position. Avoid alcohol and sedatives close to bedtime.  2. Refill supplies x1 year.  Return to clinic 1 year or sooner symptoms warrant.      I have reviewed the results of my evaluation and impression and discussed my recommendations in detail with the patient.      Signed by  BRIDGETTE Dumont    July 26, 2022      CC: Nathaniel Jordan MD         No ref. provider found      "

## 2022-12-09 RX ORDER — CARVEDILOL 12.5 MG/1
TABLET ORAL
Qty: 180 TABLET | Refills: 3 | Status: SHIPPED | OUTPATIENT
Start: 2022-12-09

## 2023-02-01 DIAGNOSIS — I10 ESSENTIAL HYPERTENSION: ICD-10-CM

## 2023-02-01 DIAGNOSIS — I42.8 NONISCHEMIC CARDIOMYOPATHY: Primary | ICD-10-CM

## 2023-02-01 DIAGNOSIS — I49.3 PREMATURE VENTRICULAR CONTRACTIONS: ICD-10-CM

## 2023-03-29 ENCOUNTER — OFFICE VISIT (OUTPATIENT)
Dept: CARDIOLOGY | Facility: CLINIC | Age: 63
End: 2023-03-29
Payer: COMMERCIAL

## 2023-03-29 VITALS
DIASTOLIC BLOOD PRESSURE: 98 MMHG | HEART RATE: 68 BPM | RESPIRATION RATE: 18 BRPM | BODY MASS INDEX: 34.59 KG/M2 | WEIGHT: 261 LBS | OXYGEN SATURATION: 95 % | HEIGHT: 73 IN | SYSTOLIC BLOOD PRESSURE: 178 MMHG

## 2023-03-29 DIAGNOSIS — I49.3 PREMATURE VENTRICULAR CONTRACTIONS: ICD-10-CM

## 2023-03-29 DIAGNOSIS — I10 ESSENTIAL HYPERTENSION: ICD-10-CM

## 2023-03-29 DIAGNOSIS — I42.8 NONISCHEMIC CARDIOMYOPATHY: Primary | ICD-10-CM

## 2023-03-29 RX ORDER — LISINOPRIL 40 MG/1
40 TABLET ORAL DAILY
Qty: 30 TABLET | Refills: 11 | Status: CANCELLED | OUTPATIENT
Start: 2023-03-29

## 2023-03-29 RX ORDER — LISINOPRIL 40 MG/1
40 TABLET ORAL DAILY
Qty: 90 TABLET | Refills: 3 | Status: SHIPPED | OUTPATIENT
Start: 2023-03-29

## 2023-03-29 RX ORDER — ALLOPURINOL 100 MG/1
100 TABLET ORAL DAILY
COMMUNITY
Start: 2023-03-22

## 2023-03-29 NOTE — PROGRESS NOTES
[facility] Cardiology    Patient ID: Conner Dunn is a 62 y.o. male.  : 1960   Contact: [phone]    Encounter date: 2023    PCP: Nathaniel Jordan MD      Chief complaint:   Chief Complaint   Patient presents with   • Cardiomyopathy       Problem List:  1. Nonischemic cardiomyopathy:  a. Echocardiogram, 2011: EF 26% with moderate to severe global  hypokinesis, moderate LVH, left atrium of 4.8 cm.  b. Cardiolite GXT, 2011, Dr. Hernandez: EF 26%. Patient walked for 8:28, achieving 86% of THR. Negative for chest pain. PVCs at rest, resolving with exercise. No evidence of reversible ischemia.  c. C, 2011, [facility]: EF 20-25%. Mild MR. Normal coronary arteries.  d. Evaluation at the [facility] in  2011 with an echocardiogram revealing EF 30%.   e. Holter monitor, 2011, revealing 11,000 PVCs in a 24-hour period of time.  f. Initiation of amiodarone therapy.  g. Echocardiogram, 2011 at the [facility]: EF 51%. Holter revealed rare PVCs.  h. Discontinuation of amiodarone therapy.  i. Echocardiogram, 2011, [facility]: EF 61%  j. 24h Holter monitor, Dec 2011: 3% PVCs.  k. Echocardiogram, 2014: EF 55-60%. Trace MR/TR.  l. Echocardiogram, 2017: EF 55-60%. Trace MR/TR.  m. Echocardiogram, 2020: EF 55-60%. Trace MR/TR. LV cavity mildly dilated.  n. Echocardiogram, 03/15/2023; EF 55%. Mild concentric LVH. Analysis of MV inflow and tissue doppler suggests abnormal diastolic function without elevated LA pressure. Frequent ventricular ectopy. Mild aortic root dilation.   2. PVC's  a. Holter monitor, 2020: Monitored for 1 day, 1 hour, and 22 minutes. 11.3% PVC's. (Increase Coreg to 12.5 mg BID.)  3. Severe DARREL  a. Uses CPAP, 3/2/22  4. Metabolic syndrome.  5. History of mild renal insufficiency.  6. Mild-to-moderate ascending aortic dilatation, 42 mm ([facility]).  7. Cholecystectomy, 2016.    Allergies    Allergen Reactions   • Ibuprofen Other (See Comments)     Chest Heaviness       Current Medications:    Current Outpatient Medications:   •  ascorbic acid (VITAMIN C) 1000 MG tablet, Take 1 tablet by mouth Daily., Disp: 90 tablet, Rfl: 0  •  carvedilol (COREG) 12.5 MG tablet, TAKE 1 TABLET BY MOUTH  TWICE DAILY (Patient taking differently: Take 6.25 mg by mouth 2 (Two) Times a Day With Meals.), Disp: 180 tablet, Rfl: 3  •  lisinopril (PRINIVIL,ZESTRIL) 40 MG tablet, Take 1 tablet by mouth Daily., Disp: 90 tablet, Rfl: 3  •  Methylcellulose, Laxative, (CITRUCEL PO), Take  by mouth As Needed., Disp: , Rfl:   •  Multiple Vitamins-Minerals (MULTIVITAMIN PO), Take 1 tablet by mouth Daily., Disp: , Rfl:   •  naproxen sodium (ALEVE) 220 MG tablet, Take 2 tablets by mouth Daily As Needed for Mild Pain., Disp: , Rfl:   •  allopurinol (ZYLOPRIM) 100 MG tablet, Take 1 tablet by mouth Daily. (Patient not taking: Reported on 3/29/2023), Disp: , Rfl:     HPI    Conner Dunn is a 62 y.o. male who presents today for 12-month follow up of nonischemic cardiomyopathy, PVCs, and cardiac risk factors. Since last visit, he had a recent episode of pneumonia two weeks ago and is following up with his PCP with [facility]. While having the pneumonia he states that he had a few episodes of mild dizziness and took his blood pressure and heart rate at home. He states his blood pressure was normal but his heart rate was between 38-44bpm. His PCP found that his HR was also low in the office when he came in with the pneumonia and decreased his carvedilol in half to 6.25 mg BID. His pneumonia has since resolved and he denies any more episodes of the bradycardia or dizziness since. He is wanting to increase his physical activity and adds that he was not very physically active this winter like he should have been. Denies any chest pain, palpitations, dizziness before or since his pneumonia resolved, numbness, tingling, presyncope or  "syncope. He denies any cardiac concerns. Recent echocardiogram on 3/15/2023 reviewed with patient and is stable and unchanged from previous echo.     The following portions of the patient's history were reviewed and updated as appropriate: allergies, current medications and problem list.    ROS:  Pertinent positives as listed in the HPI.  All other systems reviewed are negative.         Vitals:    03/29/23 1144   BP: 178/98   Pulse: 68   Resp: 18   SpO2: 95%   Weight: 118 kg (261 lb)   Height: 185.4 cm (73\")       Physical Exam:  General: Alert and oriented.  Neck: Jugular venous pressure is within normal limits. Carotids have normal upstrokes without bruits.   Cardiovascular: Heart has a nondisplaced focal PMI. Regular rate and rhythm. No murmur, gallop or rub.  Lungs: Clear breath sounds bilaterally, no rales or wheezes. Equal expansion is noted.   Extremities: Show no edema, 2+ pulses.  Skin: Warm and dry.  Neurologic: Nonfocal.     Diagnostic Data (reviewed with patient):    Recent labs brought in by patient from PCP and reviewed.      Assessment:    ICD-10-CM ICD-9-CM   1. Nonischemic cardiomyopathy (HCC)  I42.8 425.4   2. Premature ventricular contractions  I49.3 427.69   3. Essential hypertension  I10 401.9         Plan:  1. Encouraged to exercise 4-5 times a week for at least 30 minutes.  2. Encouraged to follow a heart healthy diet and decrease salt intake.  3. Continue on carvedilol 6.25 mg BID for rate control.  4. Increase lisinopril to 40 mg daily for hypertension and ischemic cardiomyopathy.  5. Continue all other current medications.  6. F/up in 12 months, sooner if needed.      Janell Mccann PA-C scribing for Kira Scanlon MD, FACC.    I Kira Scanlon MD personally performed the services described in this documentation as scribed by the above individual in my presence, and it is both accurate and complete.    Kira Scanlon MD, FACC              "

## 2023-04-06 RX ORDER — CARVEDILOL 6.25 MG/1
6.25 TABLET ORAL 2 TIMES DAILY
Qty: 180 TABLET | Refills: 3 | Status: SHIPPED | OUTPATIENT
Start: 2023-04-06

## 2023-07-25 ENCOUNTER — OFFICE VISIT (OUTPATIENT)
Dept: SLEEP MEDICINE | Facility: HOSPITAL | Age: 63
End: 2023-07-25
Payer: COMMERCIAL

## 2023-07-25 VITALS
SYSTOLIC BLOOD PRESSURE: 132 MMHG | WEIGHT: 254.2 LBS | HEART RATE: 59 BPM | BODY MASS INDEX: 33.69 KG/M2 | DIASTOLIC BLOOD PRESSURE: 85 MMHG | HEIGHT: 73 IN | OXYGEN SATURATION: 96 %

## 2023-07-25 DIAGNOSIS — G47.33 OSA (OBSTRUCTIVE SLEEP APNEA): Primary | ICD-10-CM

## 2023-07-25 PROCEDURE — 99213 OFFICE O/P EST LOW 20 MIN: CPT | Performed by: NURSE PRACTITIONER

## 2023-07-25 NOTE — PROGRESS NOTES
Chief Complaint:   Chief Complaint   Patient presents with    Follow-up       HPI:    Conner Dunn is a 62 y.o. male here for follow-up of sleep apnea.  Patient continues to do very well with CPAP therapy.  Patient last seen 7/26/2022.  Patient continues to sleep 6-1/2 to 7 hours nightly and does go to sleep very quickly.  Patient does get up x1 in the night.  Patient has an [place] score of 9/24.  Patient has no concerns or complaints today and wishes to continue CPAP therapy.        Current medications are:   Current Outpatient Medications:     allopurinol (ZYLOPRIM) 100 MG tablet, Take 1 tablet by mouth Daily., Disp: , Rfl:     ascorbic acid (VITAMIN C) 1000 MG tablet, Take 1 tablet by mouth Daily., Disp: 90 tablet, Rfl: 0    carvedilol (COREG) 6.25 MG tablet, Take 1 tablet by mouth 2 (Two) Times a Day., Disp: 180 tablet, Rfl: 3    lisinopril (PRINIVIL,ZESTRIL) 40 MG tablet, Take 1 tablet by mouth Daily., Disp: 90 tablet, Rfl: 3    Methylcellulose, Laxative, (CITRUCEL PO), Take  by mouth As Needed., Disp: , Rfl:     Multiple Vitamins-Minerals (MULTIVITAMIN PO), Take 1 tablet by mouth Daily., Disp: , Rfl:     naproxen sodium (ALEVE) 220 MG tablet, Take 2 tablets by mouth Daily As Needed for Mild Pain., Disp: , Rfl: .      The patient's relevant past medical, surgical, family and social history were reviewed and updated in Epic as appropriate.       Review of Systems   Respiratory:  Positive for apnea.    Cardiovascular:  Positive for palpitations.   Genitourinary:  Positive for frequency.   Psychiatric/Behavioral:  Positive for sleep disturbance.    All other systems reviewed and are negative.      Objective:    Physical Exam  Constitutional:       Appearance: Normal appearance.   HENT:      Head: Normocephalic and atraumatic.      Mouth/Throat:      Comments: Class 3 airway  Cardiovascular:      Rate and Rhythm: Normal rate and regular rhythm.   Pulmonary:      Breath sounds: Normal breath sounds.   Skin:      General: Skin is warm and dry.   Neurological:      Mental Status: He is alert and oriented to person, place, and time.   Psychiatric:         Mood and Affect: Mood normal.         Behavior: Behavior normal.         Thought Content: Thought content normal.         Judgment: Judgment normal.     CPAP Report  89/90 days of use  Greater than 4-hour use 98.9  AHI 1.7  CPAP 10 cm H2O      The patient continues to use and benefit from CPAP therapy.    ASSESSMENT/PLAN    Diagnoses and all orders for this visit:    1. DARREL (obstructive sleep apnea) (Primary)  -     PAP Therapy        Counseled patient regarding multimodal approach with healthy nutrition, healthy sleep, regular physical activity, social activities, counseling, and medications. Encouraged to practice lateral sleep position. Avoid alcohol and sedatives close to bedtime.  Fill supplies x1 year.  Return to clinic 1 year sooner symptoms warrant.      I have reviewed the results of my evaluation and impression and discussed my recommendations in detail with the patient.      Signed by  BRIDGETTE Dumont    July 25, 2023      CC: Nathaniel Jordan MD         No ref. provider found

## 2024-02-07 RX ORDER — CARVEDILOL 6.25 MG/1
6.25 TABLET ORAL 2 TIMES DAILY
Qty: 180 TABLET | Refills: 3 | Status: SHIPPED | OUTPATIENT
Start: 2024-02-07

## 2024-02-09 RX ORDER — LISINOPRIL 40 MG/1
40 TABLET ORAL DAILY
Qty: 90 TABLET | Refills: 3 | Status: SHIPPED | OUTPATIENT
Start: 2024-02-09

## 2024-04-03 ENCOUNTER — OFFICE VISIT (OUTPATIENT)
Dept: CARDIOLOGY | Facility: CLINIC | Age: 64
End: 2024-04-03
Payer: COMMERCIAL

## 2024-04-03 ENCOUNTER — PATIENT ROUNDING (BHMG ONLY) (OUTPATIENT)
Dept: CARDIOLOGY | Facility: CLINIC | Age: 64
End: 2024-04-03
Payer: COMMERCIAL

## 2024-04-03 VITALS
HEIGHT: 76 IN | DIASTOLIC BLOOD PRESSURE: 80 MMHG | OXYGEN SATURATION: 94 % | WEIGHT: 256 LBS | BODY MASS INDEX: 31.17 KG/M2 | SYSTOLIC BLOOD PRESSURE: 128 MMHG | HEART RATE: 76 BPM

## 2024-04-03 DIAGNOSIS — I10 ESSENTIAL HYPERTENSION: ICD-10-CM

## 2024-04-03 DIAGNOSIS — I42.8 NONISCHEMIC CARDIOMYOPATHY: Primary | ICD-10-CM

## 2024-04-03 DIAGNOSIS — I49.3 PREMATURE VENTRICULAR CONTRACTIONS: ICD-10-CM

## 2024-04-03 PROCEDURE — 93000 ELECTROCARDIOGRAM COMPLETE: CPT | Performed by: PHYSICIAN ASSISTANT

## 2024-04-03 PROCEDURE — 99214 OFFICE O/P EST MOD 30 MIN: CPT | Performed by: PHYSICIAN ASSISTANT

## 2024-04-03 NOTE — PROGRESS NOTES
[facility] Cardiology    Patient ID: Conner Dunn is a 63 y.o. male.  : 1960   Contact: [phone]    Encounter date: 2024    PCP: Nathaniel Jordan MD      Chief complaint:   Chief Complaint   Patient presents with    Nonischemic cardiomyopathy       Problem List:  Nonischemic cardiomyopathy:  Echocardiogram, 2011: EF 26% with moderate to severe global  hypokinesis, moderate LVH, left atrium of 4.8 cm.  Cardiolite GXT, 2011, Dr. Hernandez: EF 26%. Patient walked for 8:28, achieving 86% of THR. Negative for chest pain. PVCs at rest, resolving with exercise. No evidence of reversible ischemia.  [facility], 2011, [facility]: EF 20-25%. Mild MR. Normal coronary arteries.  Evaluation at the [facility] in  2011 with an echocardiogram revealing EF 30%.   Holter monitor, 2011, revealing 11,000 PVCs in a 24-hour period of time.  Initiation of amiodarone therapy.  Echocardiogram, 2011 at the [facility]: EF 51%. Holter revealed rare PVCs.  Discontinuation of amiodarone therapy.  Echocardiogram, 2011, [facility]: EF 61%  24h Holter monitor, Dec 2011: 3% PVCs.  Echocardiogram, 2014: EF 55-60%. Trace MR/TR.  Echocardiogram, 2017: EF 55-60%. Trace MR/TR.  Echocardiogram, 2020: EF 55-60%. Trace MR/TR. LV cavity mildly dilated.  Echocardiogram, 03/15/2023; EF 55%. Mild concentric LVH. Analysis of MV inflow and tissue doppler suggests abnormal diastolic function without elevated LA pressure. Frequent ventricular ectopy. Mild aortic root dilation.   PVC's  Holter monitor, 2020: Monitored for 1 day, 1 hour, and 22 minutes. 11.3% PVC's.  Severe DARREL  Uses CPAP, 3/2/22  Metabolic syndrome.  History of mild renal insufficiency.  Mild-to-moderate ascending aortic dilatation, 42 mm ([facility]).  Cholecystectomy, 2016.    Allergies   Allergen Reactions    Ibuprofen Other (See Comments)     Chest Heaviness       Current  Medications:    Current Outpatient Medications:     allopurinol (ZYLOPRIM) 100 MG tablet, Take 1 tablet by mouth Daily., Disp: , Rfl:     ascorbic acid (VITAMIN C) 1000 MG tablet, Take 1 tablet by mouth Daily., Disp: 90 tablet, Rfl: 0    carvedilol (COREG) 6.25 MG tablet, TAKE 1 TABLET BY MOUTH TWICE  DAILY, Disp: 180 tablet, Rfl: 3    lisinopril (PRINIVIL,ZESTRIL) 40 MG tablet, TAKE 1 TABLET BY MOUTH DAILY, Disp: 90 tablet, Rfl: 3    Methylcellulose, Laxative, (CITRUCEL PO), Take  by mouth As Needed., Disp: , Rfl:     Multiple Vitamins-Minerals (MULTIVITAMIN PO), Take 1 tablet by mouth Daily., Disp: , Rfl:     naproxen sodium (ALEVE) 220 MG tablet, Take 2 tablets by mouth Daily As Needed for Mild Pain., Disp: , Rfl:     HPI    Conner Dunn is a 63 y.o. male who presents today for a 1 year follow up of NICM and cardiac risk factors. Since last visit, patient overall has been doing well.  He is having frequent PVCs by EKG today but is asymptomatic.  He continues to be compliant with CPAP and is monitored by our sleep medicine specialist here at [facility].  He states that he sometimes checks his blood pressure while out at [facility] or a pharmacy and it has been within normal limits.  He denies any chest pain, shortness of breath or lower extremity edema.  Now that the weather is better he is becoming more active and walking outside.  Patient states that he is also doing some yard work and denies any chest pain or shortness of breath while doing this physical activity.  He does state that he had recent blood work a couple of months ago by his PCP but he does not have the results today.      The following portions of the patient's history were reviewed and updated as appropriate: allergies, current medications and problem list.    Pertinent positives as listed in the HPI.  All other systems reviewed are negative.         Vitals:    04/03/24 0957 04/03/24 1011   BP: 150/80 128/80   BP Location: Left arm Left arm  "  Patient Position: Sitting Sitting   Cuff Size: Adult    Pulse: 76    SpO2: 94%    Weight: 116 kg (256 lb)    Height: 193 cm (76\")        Physical Exam:  General: Alert and oriented.  Neck: Jugular venous pressure is within normal limits. Carotids have normal upstrokes without bruits.   Cardiovascular: Regular rate and rhythm with frequent ectopy. No murmur, gallop or rub.  Lungs: Clear, no rales or wheezes. Equal expansion is noted.   Extremities: No peripheral edema  Skin: Warm and dry.  Neurologic: Nonfocal.     Diagnostic Data:  Recent blood work from PCP a couple of months ago, unavailable currently             ECG 12 Lead    Date/Time: 4/3/2024 10:24 AM  Performed by: Nilsa Helton PA-C    Authorized by: Nilsa Helton PA-C  Comparison: compared with previous ECG from 1/30/2020  Similar to previous ECG  Rhythm: sinus rhythm  Ectopy: unifocal PVCs  BPM: 76    Clinical impression: non-specific ECG            Assessment:    ICD-10-CM ICD-9-CM   1. Nonischemic cardiomyopathy  I42.8 425.4   2. Premature ventricular contractions  I49.3 427.69   3. Essential hypertension  I10 401.9         Plan:  Patient has stable cardiac status.  Still continues to have frequent PVCs but is asymptomatic.  Will contact his PCP to check his most recent blood work.  In 2022 his LDL was 106 and patient is not currently on any cholesterol medication.  Encouraged patient to increase his physical activity for his cardiovascular health.  Continue on carvedilol 6.25 mg BID and lisinopril 40 mg daily for hypertension and rate control.  Encouraged patient to continue to be compliant with CPAP machine.  F/up in 12 months, sooner if needed.    Nilsa Helton PA-C             "

## 2024-04-03 NOTE — PROGRESS NOTES
April 3, 2024    Hello, may I speak with Conner Dunn? Yes.     My name is Danielle SHAH      I am  with [facility] CARDIOLOGY  [address]  [address]  [place] [address] [phone].    Before we get started may I verify your date of birth? 1960, Yes, correct.    I am calling to officially welcome you to our practice and ask about your recent visit. Is this a good time to talk? Yes.    Tell me about your visit with us. What things went well?  Everything.       We're always looking for ways to make our patients' experiences even better. Do you have recommendations on ways we may improve?  No.  I came in early & got in early for my appointment.    Overall were you satisfied with your first visit to our practice? Yes.       I appreciate you taking the time to speak with me today. Is there anything else I can do for you? No      Thank you, and have a great day.

## 2024-07-18 ENCOUNTER — TELEPHONE (OUTPATIENT)
Dept: CARDIOLOGY | Facility: CLINIC | Age: 64
End: 2024-07-18
Payer: COMMERCIAL

## 2024-07-18 NOTE — TELEPHONE ENCOUNTER
Patient left voice mail message.  He states they have changed insurance and his pharmacy has changed from [facility] to [facility].  Chart updated with new pharmacy.  LM for patient.  He is encouraged to call back with any refills he might need.

## 2024-07-23 ENCOUNTER — OFFICE VISIT (OUTPATIENT)
Dept: SLEEP MEDICINE | Facility: CLINIC | Age: 64
End: 2024-07-23
Payer: COMMERCIAL

## 2024-07-23 VITALS
TEMPERATURE: 98 F | BODY MASS INDEX: 31.54 KG/M2 | WEIGHT: 259 LBS | DIASTOLIC BLOOD PRESSURE: 80 MMHG | HEART RATE: 61 BPM | SYSTOLIC BLOOD PRESSURE: 122 MMHG | HEIGHT: 76 IN | OXYGEN SATURATION: 95 %

## 2024-07-23 DIAGNOSIS — G47.33 OBSTRUCTIVE SLEEP APNEA: Primary | ICD-10-CM

## 2024-07-23 PROCEDURE — 99213 OFFICE O/P EST LOW 20 MIN: CPT | Performed by: NURSE PRACTITIONER

## 2024-07-23 NOTE — PROGRESS NOTES
Chief Complaint:   Chief Complaint   Patient presents with    Follow-up    Sleep Apnea       HPI:    Conner Dunn is a 63 y.o. male here for follow-up of sleep apnea.  Patient was last seen 7/23/2023.  Patient continues to do well with CPAP therapy.  Patient is sleeping 6-1/2 to 7 hours nightly and feels rested upon awakening.  Patient goes to sleep quickly and does get up x 1.  Patient puts [place] score of 6/24.  Patient does well with nasal mask and standard tubing.  Patient has no concerns or complaints and will continue therapy.        Current medications are:   Current Outpatient Medications:     allopurinol (ZYLOPRIM) 100 MG tablet, Take 1 tablet by mouth Daily., Disp: , Rfl:     ascorbic acid (VITAMIN C) 1000 MG tablet, Take 1 tablet by mouth Daily., Disp: 90 tablet, Rfl: 0    carvedilol (COREG) 6.25 MG tablet, TAKE 1 TABLET BY MOUTH TWICE  DAILY, Disp: 180 tablet, Rfl: 3    lisinopril (PRINIVIL,ZESTRIL) 40 MG tablet, TAKE 1 TABLET BY MOUTH DAILY, Disp: 90 tablet, Rfl: 3    Multiple Vitamins-Minerals (MULTIVITAMIN PO), Take 1 tablet by mouth Daily., Disp: , Rfl:     naproxen sodium (ALEVE) 220 MG tablet, Take 2 tablets by mouth Daily As Needed for Mild Pain., Disp: , Rfl: .      The patient's relevant past medical, surgical, family and social history were reviewed and updated in Epic as appropriate.       Review of Systems   Respiratory:  Positive for apnea.    Cardiovascular:  Positive for palpitations.   Genitourinary:  Positive for frequency.   Musculoskeletal:  Positive for back pain.   Psychiatric/Behavioral:  Positive for sleep disturbance.    All other systems reviewed and are negative.        Objective:    Physical Exam  Constitutional:       Appearance: Normal appearance.   HENT:      Head: Normocephalic and atraumatic.      Mouth/Throat:      Comments: Class 3 airway  Cardiovascular:      Rate and Rhythm: Normal rate and regular rhythm.   Pulmonary:      Effort: Pulmonary effort is normal.       Breath sounds: Normal breath sounds.   Skin:     General: Skin is warm and dry.   Neurological:      Mental Status: He is alert and oriented to person, place, and time.   Psychiatric:         Mood and Affect: Mood normal.         Behavior: Behavior normal.         Thought Content: Thought content normal.         Judgment: Judgment normal.         CPAP Report    90/90 days of use  Greater than 4-hour use 100%  AHI 1.6  Setting 10 cm H2O  The patient continues to use and benefit from CPAP therapy.    ASSESSMENT/PLAN    Diagnoses and all orders for this visit:    1. Obstructive sleep apnea (Primary)  -     PAP Therapy        Counseled patient regarding multimodal approach with healthy nutrition, healthy sleep, regular physical activity, social activities, counseling, and medications. Encouraged to practice lateral sleep position. Avoid alcohol and sedatives close to bedtime.    Refill supplies x 1 year.  Return to clinic 1 year or sooner if symptoms warrant.    Signed by  BRIDGETTE Dumont    July 23, 2024      CC: Nathaniel Jordan MD         No ref. provider found

## 2024-09-13 RX ORDER — LISINOPRIL 40 MG/1
40 TABLET ORAL DAILY
Qty: 90 TABLET | Refills: 3 | Status: SHIPPED | OUTPATIENT
Start: 2024-09-13

## 2024-09-13 RX ORDER — CARVEDILOL 6.25 MG/1
6.25 TABLET ORAL 2 TIMES DAILY
Qty: 180 TABLET | Refills: 3 | Status: SHIPPED | OUTPATIENT
Start: 2024-09-13

## 2025-03-10 RX ORDER — CARVEDILOL 6.25 MG/1
6.25 TABLET ORAL 2 TIMES DAILY
Qty: 60 TABLET | Refills: 0 | Status: SHIPPED | OUTPATIENT
Start: 2025-03-10 | End: 2025-03-11 | Stop reason: SDUPTHER

## 2025-03-10 RX ORDER — LISINOPRIL 40 MG/1
40 TABLET ORAL DAILY
Qty: 30 TABLET | Refills: 0 | Status: SHIPPED | OUTPATIENT
Start: 2025-03-10 | End: 2025-03-11 | Stop reason: SDUPTHER

## 2025-03-11 RX ORDER — CARVEDILOL 6.25 MG/1
6.25 TABLET ORAL 2 TIMES DAILY
Qty: 60 TABLET | Refills: 0 | Status: SHIPPED | OUTPATIENT
Start: 2025-03-11

## 2025-03-11 RX ORDER — LISINOPRIL 40 MG/1
40 TABLET ORAL DAILY
Qty: 30 TABLET | Refills: 0 | Status: SHIPPED | OUTPATIENT
Start: 2025-03-11

## 2025-04-07 RX ORDER — CARVEDILOL 6.25 MG/1
6.25 TABLET ORAL 2 TIMES DAILY
Qty: 180 TABLET | Refills: 1 | Status: SHIPPED | OUTPATIENT
Start: 2025-04-07 | End: 2025-04-11 | Stop reason: SDUPTHER

## 2025-04-07 RX ORDER — LISINOPRIL 40 MG/1
40 TABLET ORAL DAILY
Qty: 90 TABLET | Refills: 1 | Status: SHIPPED | OUTPATIENT
Start: 2025-04-07 | End: 2025-04-11 | Stop reason: SDUPTHER

## 2025-04-07 NOTE — TELEPHONE ENCOUNTER
11/21/24  Glu  108  BUN   16  Creat  1.28  Na  140  K+  4.3  Cl  106  CO2  22  Anion Gap 12  Calcium 9.7

## 2025-04-11 RX ORDER — LISINOPRIL 40 MG/1
40 TABLET ORAL DAILY
Qty: 90 TABLET | Refills: 1 | Status: SHIPPED | OUTPATIENT
Start: 2025-04-11

## 2025-04-11 RX ORDER — CARVEDILOL 6.25 MG/1
6.25 TABLET ORAL 2 TIMES DAILY
Qty: 180 TABLET | Refills: 1 | Status: SHIPPED | OUTPATIENT
Start: 2025-04-11

## 2025-04-16 ENCOUNTER — OFFICE VISIT (OUTPATIENT)
Dept: CARDIOLOGY | Facility: CLINIC | Age: 65
End: 2025-04-16
Payer: MEDICARE

## 2025-04-16 VITALS
SYSTOLIC BLOOD PRESSURE: 132 MMHG | HEIGHT: 73 IN | WEIGHT: 252 LBS | BODY MASS INDEX: 33.4 KG/M2 | DIASTOLIC BLOOD PRESSURE: 88 MMHG | HEART RATE: 69 BPM | OXYGEN SATURATION: 97 %

## 2025-04-16 DIAGNOSIS — I49.3 FREQUENT PVCS: ICD-10-CM

## 2025-04-16 DIAGNOSIS — I10 ESSENTIAL HYPERTENSION: ICD-10-CM

## 2025-04-16 DIAGNOSIS — I42.8 NONISCHEMIC CARDIOMYOPATHY: Primary | ICD-10-CM

## 2025-04-16 DIAGNOSIS — I25.10 CORONARY ARTERY CALCIFICATION: ICD-10-CM

## 2025-04-16 DIAGNOSIS — E78.5 HYPERLIPIDEMIA LDL GOAL <70: ICD-10-CM

## 2025-04-16 PROCEDURE — 93000 ELECTROCARDIOGRAM COMPLETE: CPT | Performed by: PHYSICIAN ASSISTANT

## 2025-04-16 PROCEDURE — 3075F SYST BP GE 130 - 139MM HG: CPT | Performed by: PHYSICIAN ASSISTANT

## 2025-04-16 PROCEDURE — 99214 OFFICE O/P EST MOD 30 MIN: CPT | Performed by: PHYSICIAN ASSISTANT

## 2025-04-16 PROCEDURE — 3079F DIAST BP 80-89 MM HG: CPT | Performed by: PHYSICIAN ASSISTANT

## 2025-04-16 NOTE — PROGRESS NOTES
[facility] Cardiology    Patient ID: Conner Dunn is a 64 y.o. male.  : 1960   Contact: Home phone not available    Encounter date: 2025    PCP: Nathaniel Jordan MD      Chief complaint:   Chief Complaint   Patient presents with    Nonischemic cardiomyopathy       Problem List:  Nonischemic cardiomyopathy:  Echocardiogram, 2011: EF 26% with moderate to severe global  hypokinesis, moderate LVH, left atrium of 4.8 cm.  Cardiolite GXT, 2011, Dr. Hernandez: EF 26%. Patient walked for 8:28, achieving 86% of THR. Negative for chest pain. PVCs at rest, resolving with exercise. No evidence of reversible ischemia.  [facility], 2011, [facility]: EF 20-25%. Mild MR. Normal coronary arteries.  Evaluation at the [facility] in  2011 with an echocardiogram revealing EF 30%.   Holter monitor, 2011, revealing 11,000 PVCs in a 24-hour period of time.  Initiation of amiodarone therapy.  Echocardiogram, 2011 at the [facility]: EF 51%. Holter revealed rare PVCs.  Discontinuation of amiodarone therapy.  Echocardiogram, 2011, [facility]: EF 61%  24h Holter monitor, Dec 2011: 3% PVCs.  Echocardiogram, 2014: EF 55-60%. Trace MR/TR.  Echocardiogram, 2017: EF 55-60%. Trace MR/TR.  Echocardiogram, 2020: EF 55-60%. Trace MR/TR. LV cavity mildly dilated.  Echocardiogram, 03/15/2023; EF 55%. Mild concentric LVH. Analysis of MV inflow and tissue doppler suggests abnormal diastolic function without elevated LA pressure. Frequent ventricular ectopy. Mild aortic root dilation.   PVC's  Holter monitor, 2020: Monitored for 1 day, 1 hour, and 22 minutes. 11.3% PVC's.  Severe DARREL  Uses CPAP, 3/2/22  Metabolic syndrome.  History of mild renal insufficiency.  Mild-to-moderate ascending aortic dilatation, 42 mm ([facility]).  Cholecystectomy, 2016.       Allergies   Allergen Reactions    Ibuprofen Other (See Comments)     Chest Heaviness  "      Current Medications:    Current Outpatient Medications:     allopurinol (ZYLOPRIM) 100 MG tablet, Take 1 tablet by mouth Daily., Disp: , Rfl:     ascorbic acid (VITAMIN C) 1000 MG tablet, Take 1 tablet by mouth Daily., Disp: 90 tablet, Rfl: 0    carvedilol (COREG) 6.25 MG tablet, Take 1 tablet by mouth 2 (Two) Times a Day., Disp: 180 tablet, Rfl: 1    lisinopril (PRINIVIL,ZESTRIL) 40 MG tablet, Take 1 tablet by mouth Daily., Disp: 90 tablet, Rfl: 1    Multiple Vitamins-Minerals (MULTIVITAMIN PO), Take 1 tablet by mouth Daily., Disp: , Rfl:     naproxen sodium (ALEVE) 220 MG tablet, Take 2 tablets by mouth Daily As Needed for Mild Pain., Disp: , Rfl:     HPI    Conner Dunn is a 64 y.o. male who presents today for a follow up of nonischemic cardiomyopathy, premature ventricular contractions, hypertension, hyperlipidemia patient overall has been doing well and cardiac risk factors. Since last visit,        The following portions of the patient's history were reviewed and updated as appropriate: allergies, current medications and problem list.    Pertinent positives as listed in the HPI.  All other systems reviewed are negative.         Vitals:    04/16/25 1153   BP: 132/88   BP Location: Left arm   Patient Position: Sitting   Cuff Size: Adult   Pulse: 69   SpO2: 97%   Weight: 114 kg (252 lb)   Height: 185.4 cm (73\")       Physical Exam:  General: Alert and oriented.  Neck: Jugular venous pressure is within normal limits. Carotids have normal upstrokes without bruits.   Cardiovascular: Heart has a nondisplaced focal PMI. Regular rate and rhythm. No murmur, gallop or rub.  Lungs: Clear, no rales or wheezes. Equal expansion is noted.   Extremities: Show no edema.  Skin: Warm and dry.  Neurologic: Nonfocal.     Diagnostic Data (reviewed with patient):    Lab date: 11/21/2024  FLP: , , HDL 39,   CMP: Glu 108, BUN 16, Creat 1.28, eGFR 62, Na 140, K 4.3, Cl 106, CO2 22, Ca 9.7, Alk Phos 81, AST " 24, ALT 26  CBC: WBC 8.5, RBC 4.94, HGB 15.5, HCT 45.3, MCV 92, MCH 31,         ECG 12 Lead    Date/Time: 4/16/2025 1:31 PM  Performed by: Nilsa Helton PA-C    Authorized by: Nilsa Helton PA-C  Comparison: compared with previous ECG from 4/3/2024  Rhythm: sinus rhythm and sinus bradycardia  Ectopy: multifocal PVCs  BPM: 69          Advance Care Planning   ACP discussion was declined by the patient. Patient does not have an advance directive, declines further assistance.           Assessment:    ICD-10-CM ICD-9-CM   1. Nonischemic cardiomyopathy  I42.8 425.4   2. Essential hypertension  I10 401.9   3. Frequent PVCs  I49.3 427.69   4. Hyperlipidemia LDL goal <70  E78.5 272.4   5. Coronary artery calcification  I25.10 414.00         Plan:  Stable cardiac status with no signs of heart failure.  Patient continues to have frequent PVCs, had an echocardiogram 2 years ago with normal ejection fraction and patient is not significantly symptomatic.  Would consider repeat echocardiogram if patient ever has signs of acute heart failure.  Patient will continue lifestyle changes to reduce his cholesterol but given that he tells us he had a positive coronary calcium score I would recommend the patient start on rosuvastatin 10 mg that was prescribed by his PCP.  Continue carvedilol 6.25 mg twice daily and lisinopril 40 mg daily for hypertension and GDMT.  Patient is not on Jardiance and/or spironolactone given patient had history of nonischemic cardiomyopathy with recovered EF.  Continue all other current medications.  F/up in 12 months, sooner if needed.    Nilsa Helton PA-C

## 2025-05-15 RX ORDER — LISINOPRIL 40 MG/1
TABLET ORAL
Qty: 30 TABLET | Refills: 11 | Status: SHIPPED | OUTPATIENT
Start: 2025-05-15

## 2025-05-15 RX ORDER — CARVEDILOL 6.25 MG/1
6.25 TABLET ORAL 2 TIMES DAILY
Qty: 60 TABLET | Refills: 11 | Status: SHIPPED | OUTPATIENT
Start: 2025-05-15

## 2025-07-24 ENCOUNTER — OFFICE VISIT (OUTPATIENT)
Dept: SLEEP MEDICINE | Facility: CLINIC | Age: 65
End: 2025-07-24
Payer: MEDICARE

## 2025-07-24 VITALS
SYSTOLIC BLOOD PRESSURE: 110 MMHG | DIASTOLIC BLOOD PRESSURE: 68 MMHG | OXYGEN SATURATION: 97 % | HEART RATE: 51 BPM | BODY MASS INDEX: 32.6 KG/M2 | WEIGHT: 246 LBS | TEMPERATURE: 98 F | HEIGHT: 73 IN

## 2025-07-24 DIAGNOSIS — G47.33 OBSTRUCTIVE SLEEP APNEA: Primary | ICD-10-CM

## 2025-07-24 PROCEDURE — 3078F DIAST BP <80 MM HG: CPT | Performed by: NURSE PRACTITIONER

## 2025-07-24 PROCEDURE — 99213 OFFICE O/P EST LOW 20 MIN: CPT | Performed by: NURSE PRACTITIONER

## 2025-07-24 PROCEDURE — 3074F SYST BP LT 130 MM HG: CPT | Performed by: NURSE PRACTITIONER

## 2025-07-24 RX ORDER — ROSUVASTATIN CALCIUM 10 MG/1
TABLET, COATED ORAL
COMMUNITY
Start: 2025-07-18

## 2025-07-24 NOTE — PROGRESS NOTES
Chief Complaint:   Chief Complaint   Patient presents with    Follow-up    Sleep Apnea       HPI:    Conner Dunn is a 64 y.o. male here for follow-up of sleep apnea.  Patient was last seen 7/23/2024.  Patient continues to do well with CPAP therapy.  Patient is sleeping 6-1/2 to 7 hours nightly.  Patient goes to sleep quickly and does get up x 1.  Patient has an [place] score of 7/24.  Patient is doing well without concern or complaint and will continue therapy.        Current medications are:   Current Outpatient Medications:     allopurinol (ZYLOPRIM) 100 MG tablet, Take 1 tablet by mouth Daily., Disp: , Rfl:     ascorbic acid (VITAMIN C) 1000 MG tablet, Take 1 tablet by mouth Daily., Disp: 90 tablet, Rfl: 0    carvedilol (COREG) 6.25 MG tablet, TAKE 1 TABLET TWICE DAILY, Disp: 60 tablet, Rfl: 11    lisinopril (PRINIVIL,ZESTRIL) 40 MG tablet, TAKE 1 TABLET EVERY DAY (NEED LABS FOR REFILLS), Disp: 30 tablet, Rfl: 11    Multiple Vitamins-Minerals (MULTIVITAMIN PO), Take 1 tablet by mouth Daily., Disp: , Rfl:     naproxen sodium (ALEVE) 220 MG tablet, Take 2 tablets by mouth Daily As Needed for Mild Pain., Disp: , Rfl:     rosuvastatin (CRESTOR) 10 MG tablet, , Disp: , Rfl: .      The patient's relevant past medical, surgical, family and social history were reviewed and updated in Epic as appropriate.       Review of Systems   Respiratory:  Positive for apnea.    Cardiovascular:  Positive for palpitations.   Genitourinary:  Positive for frequency.   Musculoskeletal:  Positive for back pain.   Psychiatric/Behavioral:  Positive for sleep disturbance.    All other systems reviewed and are negative.        Objective:    Physical Exam  Constitutional:       Appearance: Normal appearance.   HENT:      Head: Normocephalic and atraumatic.      Mouth/Throat:      Comments: Class 3 airway  Cardiovascular:      Rate and Rhythm: Bradycardia present.   Pulmonary:      Effort: Pulmonary effort is normal. No respiratory  "distress.   Skin:     General: Skin is warm and dry.   Neurological:      Mental Status: He is alert and oriented to person, place, and time.   Psychiatric:         Mood and Affect: Mood normal.         Behavior: Behavior normal.         Thought Content: Thought content normal.         Judgment: Judgment normal.   /68   Pulse 51   Temp 98 °F (36.7 °C)   Ht 185.4 cm (72.99\")   Wt 112 kg (246 lb)   SpO2 97%   BMI 32.46 kg/m²       CPAP Report  90/90 days of use  Greater than 4-hour use 100%  AHI 0.8  Setting 10 cm H2O    The patient continues to use and benefit from CPAP therapy.    ASSESSMENT/PLAN    Diagnoses and all orders for this visit:    1. Obstructive sleep apnea (Primary)  -     PAP Therapy        Counseled patient regarding multimodal approach with healthy nutrition, healthy sleep, regular physical activity, social activities, counseling, and medications. Encouraged to practice lateral sleep position. Avoid alcohol and sedatives close to bedtime.  Refill supplies x 1 year.  Return to clinic 1 year or sooner as symptoms warrant.        Signed by  BRIDGETTE Dumont    July 24, 2025      CC: Nathaniel Jordan MD         No ref. provider found      "